# Patient Record
Sex: FEMALE | Race: WHITE | Employment: STUDENT | URBAN - METROPOLITAN AREA
[De-identification: names, ages, dates, MRNs, and addresses within clinical notes are randomized per-mention and may not be internally consistent; named-entity substitution may affect disease eponyms.]

---

## 2017-01-04 ENCOUNTER — PRE VISIT (OUTPATIENT)
Dept: NEUROLOGY | Facility: CLINIC | Age: 22
End: 2017-01-04

## 2017-10-04 ENCOUNTER — TRANSFERRED RECORDS (OUTPATIENT)
Dept: HEALTH INFORMATION MANAGEMENT | Facility: CLINIC | Age: 22
End: 2017-10-04

## 2017-10-04 ENCOUNTER — MEDICAL CORRESPONDENCE (OUTPATIENT)
Dept: HEALTH INFORMATION MANAGEMENT | Facility: CLINIC | Age: 22
End: 2017-10-04

## 2017-11-14 NOTE — TELEPHONE ENCOUNTER
APPT INFO    Date /Time: 11/22/17 at 1:15PM   Reason for Appt: Sinusitis   Ref Provider/Clinic: Nenita Palomo @ [place]   Are there internal records? If yes, list: CT Head/Sinus 10/4/17 in [facility].   Patient Contact (Y/N) & Call Details: No, referred   Action: Faxed request to [facility]     OUTSIDE RECORDS CHECKLIST     CLINIC NAME COMMENTS REC (x) IMG (x)   [facility]  Records in [facility] X none

## 2017-11-22 ENCOUNTER — PRE VISIT (OUTPATIENT)
Dept: OTOLARYNGOLOGY | Facility: CLINIC | Age: 22
End: 2017-11-22

## 2017-11-22 ENCOUNTER — OFFICE VISIT (OUTPATIENT)
Dept: OTOLARYNGOLOGY | Facility: CLINIC | Age: 22
End: 2017-11-22

## 2017-11-22 VITALS — WEIGHT: 162 LBS | BODY MASS INDEX: 29.81 KG/M2 | HEIGHT: 62 IN

## 2017-11-22 DIAGNOSIS — G44.89 OTHER HEADACHE SYNDROME: Primary | ICD-10-CM

## 2017-11-22 DIAGNOSIS — R09.81 NASAL CONGESTION: ICD-10-CM

## 2017-11-22 DIAGNOSIS — E07.89 THYROID FULLNESS: ICD-10-CM

## 2017-11-22 LAB — TSH SERPL DL<=0.005 MIU/L-ACNC: 2.79 MU/L (ref 0.4–4)

## 2017-11-22 RX ORDER — NORETHINDRONE ACETATE AND ETHINYL ESTRADIOL AND FERROUS FUMARATE 5-7-9-7
1 KIT ORAL DAILY
COMMUNITY

## 2017-11-22 ASSESSMENT — PAIN SCALES - GENERAL: PAINLEVEL: MILD PAIN (2)

## 2017-11-22 NOTE — MR AVS SNAPSHOT
After Visit Summary   2017    Annette Silverman    MRN: [id number]           Patient Information     Date Of Birth          1995        Visit Information        Provider Department      2017 1:15 PM Cassidy Youssef MD [facility] Ear Nose and Throat        Today's Diagnoses     Headache    -  1    Nasal congestion        Thyroid fullness          Care Instructions    Plan of care:  Follow up with the following specialties:  Allergy  Neurology  Have blood drawn today, we will contact you with results  Clinic contact information:  1. To schedule an appointment call [phone], option 1  2. To talk to the Triage RN call [phone], option 3  3. If you need to speak to Irene or get a message to your doctor on a Friday, call the triage RN  4. IreneRN: [phone]  5. Surgery scheduling:      Elisa Nichols: [phone]      Sabrina Sherwood: [phone]  6. Fax: [phone]  7. [phone]            Follow-ups after your visit        Additional Services     ALLERGY/ASTHMA ADULT REFERRAL       Your provider has referred you to: [facility] - [phone]  [email or web address]    Please be aware that coverage of these services is subject to the terms and limitations of your health insurance plan.  Call member services at your health plan with any benefit or coverage questions.      Please bring the following with you to your appointment:    (1) Any X-Rays, CTs or MRIs which have been performed.  Contact the facility where they were done to arrange for  prior to your scheduled appointment.    (2) List of current medications  (3) This referral request   (4) Any documents/labs given to you for this referral            NEUROLOGY ADULT REFERRAL       Your provider has referred you for the following:   EEG at  [facility]: [facility] [phone]    [email or web address]  FMG: [facility] [phone]   [email or web address]  UM: Neurology [facility] [phone]   [email or web address]  General Neurology/ Shashank Pruitt for headaches    Please be aware that coverage of these services is subject to the terms and limitations of your health insurance plan.  Call member services at your health plan with any benefit or coverage questions.      Please bring the following with you to your appointment:    (1) Any X-Rays, CTs or MRIs which have been performed.  Contact the facility where they were done to arrange for  prior to your scheduled appointment.    (2) List of current medications  (3) This referral request   (4) Any documents/labs given to you for this referral                  Your next 10 appointments already scheduled     Nov 27, 2017 12:15 PM CST   (Arrive by 12:00 PM)   New Allergy with Jason Yanes MD   [facility] ([facility])    [address] [place] [id number] [phone]           Do not take anti-histamines or Zantac for seven day prior to your appointment.            Jan 04, 2018 12:00 PM CST   (Arrive by 11:45 AM)   New Patient Visit with DANIELLE Flores [facility] Neurology ([facility])    [address] [place] [id number] [phone]              Future tests that were ordered for you today     Open Future Orders        Priority Expected Expires Ordered    TSH Routine  11/22/2018 11/22/2017            Who to contact     Please call your clinic at [phone] to:    Ask questions about your health    Make or cancel appointments    Discuss your medicines    Learn about your test results    Speak to your doctor   If you have  "compliments or concerns about an experience at your clinic, or if you wish to file a complaint, please contact [facility] Patient Relations at [phone] or email us at [email or web address]         Additional Information About Your Visit        GlucoSentienthart Information     3DMGAME is an electronic gateway that provides easy, online access to your medical records. With 3DMGAME, you can request a clinic appointment, read your test results, renew a prescription or communicate with your care team.     To sign up for 3DMGAME visit the website at [email or web address]   You will be asked to enter the access code listed below, as well as some personal information. Please follow the directions to create your username and password.     Your access code is: [email or web address]  Expires: 2018  6:30 AM     Your access code will  in 90 days. If you need help or a new code, please contact your [facility] Clinic or call [phone] for assistance.        Care EveryWhere ID     This is your Care EveryWhere ID. This could be used by other organizations to access your [place] medical records  [id number]        Your Vitals Were     Height BMI (Body Mass Index)                1.575 m (5' 2\") 29.63 kg/m2           Blood Pressure from Last 3 Encounters:   No data found for BP    Weight from Last 3 Encounters:   17 73.5 kg (162 lb)              We Performed the Following     ALLERGY/ASTHMA ADULT REFERRAL     NEUROLOGY ADULT REFERRAL        Primary Care Provider    Physician No Ref-Primary       NO REF-PRIMARY PHYSICIAN        Equal Access to Services     LANDY MURCIA : Hadii aad ku hadasho Soomaali, waaxda luqadaha, qaybta kaalmada adeegyada, waxay sachinin joon stevenson . So [facility] [phone].    ATENCIÓN: Si habla español, tiene a campos disposición servicios gratuitos de asistencia lingüística. Llame al [phone].    We comply with applicable " federal civil rights laws and [place] laws. We do not discriminate on the basis of race, color, national origin, age, disability, sex, sexual orientation, or gender identity.            Thank you!     Thank you for choosing [facility] EAR NOSE AND THROAT  for your care. Our goal is always to provide you with excellent care. Hearing back from our patients is one way we can continue to improve our services. Please take a few minutes to complete the written survey that you may receive in the mail after your visit with us. Thank you!             Your Updated Medication List - Protect others around you: Learn how to safely use, store and throw away your medicines at www.disposemymeds.org.          This list is accurate as of: 11/22/17  2:15 PM.  Always use your most recent med list.                   Brand Name Dispense Instructions for use Diagnosis    losartan 2.5 mg/mL Susp    COZAAR     Take by mouth daily        norethindrone-ethinyl estradiol-iron 1-20/1-30/1-35 MG-MCG per tablet    ESTROSTEP FE     Take 1 tablet by mouth daily

## 2017-11-22 NOTE — PROGRESS NOTES
Otolaryngology Adult Consultation    Patient: Annette Silverman   : 1995     Patient presents with:  Consult:   Chief Complaint   Patient presents with     Consult     sinusitis        Referring Provider: Nenita Palomo   Consulting Physician:  Cassidy Youssef MD       Assessment/Plan: Annette has mild thyroid fullness for which I am going to order a TSH.  I also believe that she has a muscle tension type headache, and I would like her to be seen by Jacqueline Pruitt to see if there could be a component of rebound and also to recommend any therapies for muscle tension type headache.  She has concerns about allergies, and I think it is reasonable to have her seen by the allergy department as well.  Given her normal sinus CT scan, I would not recommend any surgical intervention or aggressive medical management for sinus issues.          HPI: Annette Silverman is a 22 year old female seen today in the Otolaryngology Clinicconsultation from Nenita Palomo for chronic postnasal drainage, pressure, ear pressure and head pain.  She states that she is having sinus infections about every four months.  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  She moved here in August.  She had a sinus infection at the end of July, and she felt like it never went away.  She tried prednisone which helped a little bit.  She tried multiple antibiotics which did not help.  She ran out of her nasal steroids.  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  She has no sleep apnea symptoms.  She takes Excedrin at least one or two times per week.  The only other medication is an oral contraceptive.  She drinks one cup of caffeine in the morning.  She denies nasal congestion, rhinorrhea and has not been diagnosed in the past with allergies. She did not have sinus imaging until recently which shows completely clear sinuses.         No current outpatient prescriptions on file prior to visit.  No  current facility-administered medications on file prior to visit.      No Known Allergies    Past Medical History:   Diagnosis Date     Autoimmune disease (H)     Psorasis         Review of Systems   ENT ROS 11/22/2017   Neurology Headache   Ears, Nose, Throat Ear pain, Ringing/noise in ears, Nasal congestion or drainage, Sore throat        14 point ROS neg other than the symptoms noted above.    Physical Exam:    General Assessment   The patient is alert, oriented and in no acute distress.   Head/Face/Scalp  Grossly normal.   Ears  Normal canals, auricles and tympanic membranes.   Nose  External nose is straight, skin is normal. Intranasal exam (anterior rhinoscopy) reveals normal moist mucosa, turbinate tissue without edema, erythema or crusting.  Septum mainly in the midline.    Mouth  Oral cavity shows healthy mucosa with out ulceration, masses or other lesions involving the tongue, palate, buccal mucosa, floor of mouth or gingiva.  Dentition in good repair.  Oropharynx with normal tonsils, posterior wall and base of tongue.  Neck  No significant adenopathy, thyroid or salivary gland abnormality.  Tender over TMJ, neck muscles.          Imaging:    Results for orders placed in visit on 10/04/17   CT MAXILLOFACIAL W/O CONTRAST    Status: Normal 10/4/2017    Narrative CT MAXILLOFACIAL W/O CONTRAST 10/4/2017 3:14 PM    Provided History: 2 MO of frontal and ethmoid sinus pain and pressure,  s/p 2 rounds of abx, Chronic sinusitis, unspecified     Comparison: None available     Technique: Using thin collimation multidetector helical acquisition  technique, axial, coronal, and sagittal thin section CT images were  reconstructed through the paranasal sinuses. Images were reviewed in  bone and soft tissue windows.    Findings:   The paranasal sinuses are clear.    The ostiomeatal units appear patent bilaterally. The bony walls of the  paranasal sinuses are intact.    Normal retromaxillary and pterygopalatine  fat.    The adenoid tonsils in the nasopharynx are unremarkable.      Impression Impression:    No evidence of sinusitis.    I have personally reviewed the examination and initial interpretation  and I agree with the findings.    RADHA YI MD

## 2017-11-22 NOTE — LETTER
2017       RE: Annette Silverman  [address]   [place] [address]     Dear Colleague,    Thank you for referring your patient, Annette Silverman, to the [facility] EAR NOSE AND TH[facility]AT at [facility]. Please see a copy of my visit note below.    Otolaryngology Adult Consultation    Patient: Annette Silverman   : 1995     Patient presents with:  Consult:   Chief Complaint   Patient presents with     Consult     sinusitis        Referring Provider: Nenita Palomo   Consulting Physician:  Cassidy Youssef MD       Assessment/Plan: Annette has mild thyroid fullness for which I am going to order a TSH.  I also believe that she has a muscle tension type headache, and I would like her to be seen by Jacqueline Pruitt to see if there could be a component of rebound and also to recommend any therapies for muscle tension type headache.  She has concerns about allergies, and I think it is reasonable to have her seen by the allergy department as well.  Given her normal sinus CT scan, I would not recommend any surgical intervention or aggressive medical management for sinus issues.          HPI: Annette Silverman is a 22 year old female seen today in the Otolaryngology Clinicconsultation from Nenita Palomo for chronic postnasal drainage, pressure, ear pressure and head pain.  She states that she is having sinus infections about every four months.  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  She moved here in August.  She had a sinus infection at the end of July, and she felt like it never went away.  She tried prednisone which helped a little bit.  She tried multiple antibiotics which did not help.  She ran out of her nasal steroids.  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  She has no sleep apnea symptoms.  She takes Excedrin at least one or two times per week.  The only other medication is an oral contraceptive.  She  drinks one cup of caffeine in the morning.  She denies nasal congestion, rhinorrhea and has not been diagnosed in the past with allergies. She did not have sinus imaging until recently which shows completely clear sinuses.         No current outpatient prescriptions on file prior to visit.  No current facility-administered medications on file prior to visit.      No Known Allergies    Past Medical History:   Diagnosis Date     Autoimmune disease (H)     Psorasis         Review of Systems   ENT ROS 11/22/2017   Neurology Headache   Ears, Nose, Throat Ear pain, Ringing/noise in ears, Nasal congestion or drainage, Sore throat        14 point ROS neg other than the symptoms noted above.    Physical Exam:    General Assessment   The patient is alert, oriented and in no acute distress.   Head/Face/Scalp  Grossly normal.   Ears  Normal canals, auricles and tympanic membranes.   Nose  External nose is straight, skin is normal. Intranasal exam (anterior rhinoscopy) reveals normal moist mucosa, turbinate tissue without edema, erythema or crusting.  Septum mainly in the midline.    Mouth  Oral cavity shows healthy mucosa with out ulceration, masses or other lesions involving the tongue, palate, buccal mucosa, floor of mouth or gingiva.  Dentition in good repair.  Oropharynx with normal tonsils, posterior wall and base of tongue.  Neck  No significant adenopathy, thyroid or salivary gland abnormality.  Tender over TMJ, neck muscles.          Imaging:    Results for orders placed in visit on 10/04/17   CT MAXILLOFACIAL W/O CONTRAST    Status: Normal 10/4/2017    Narrative CT MAXILLOFACIAL W/O CONTRAST 10/4/2017 3:14 PM    Provided History: 2 MO of frontal and ethmoid sinus pain and pressure,  s/p 2 rounds of abx, Chronic sinusitis, unspecified     Comparison: None available     Technique: Using thin collimation multidetector helical acquisition  technique, axial, coronal, and sagittal thin section CT images were  reconstructed  through the paranasal sinuses. Images were reviewed in  bone and soft tissue windows.    Findings:   The paranasal sinuses are clear.    The ostiomeatal units appear patent bilaterally. The bony walls of the  paranasal sinuses are intact.    Normal retromaxillary and pterygopalatine fat.    The adenoid tonsils in the nasopharynx are unremarkable.      Impression Impression:    No evidence of sinusitis.    I have personally reviewed the examination and initial interpretation  and I agree with the findings.    RADHA YI MD        Again, thank you for allowing me to participate in the care of your patient.      Sincerely,    Cassidy Youssef MD

## 2017-11-22 NOTE — NURSING NOTE
Chief Complaint   Patient presents with     Consult     sinusitis       [facility] Medical Assistant

## 2017-11-22 NOTE — PATIENT INSTRUCTIONS
Plan of care:  Follow up with the following specialties:  Allergy  Neurology  Have blood drawn today, we will contact you with results  Clinic contact information:  1. To schedule an appointment call [phone], option 1  2. To talk to the Triage RN call [phone], option 3  3. If you need to speak to Irene or get a message to your doctor on a Friday, call the triage RN  4. IreneRN: [phone]  5. Surgery scheduling:      Elisa Nichols: [phone]      Sabrina Sherwood: [phone]  6. Fax: [phone]  7. [phone]

## 2017-11-24 NOTE — TELEPHONE ENCOUNTER
APPT INFO    Date /Time: 11/27/17, 12:15pm   Reason for Appt: Nasal congestion   Ref Provider/Clinic: [facility]   Are there internal records? If yes, list: [facility]   Patient Contact (Y/N) & Call Details: No, referred    Action:      OUTSIDE RECORDS CHECKLIST     CLINIC NAME COMMENTS REC (x) IMG (x)

## 2017-11-27 ENCOUNTER — OFFICE VISIT (OUTPATIENT)
Dept: PULMONOLOGY | Facility: CLINIC | Age: 22
End: 2017-11-27
Attending: ALLERGY & IMMUNOLOGY
Payer: COMMERCIAL

## 2017-11-27 ENCOUNTER — PRE VISIT (OUTPATIENT)
Dept: PULMONOLOGY | Facility: CLINIC | Age: 22
End: 2017-11-27

## 2017-11-27 VITALS
SYSTOLIC BLOOD PRESSURE: 138 MMHG | HEART RATE: 99 BPM | OXYGEN SATURATION: 98 % | WEIGHT: 150 LBS | HEIGHT: 62 IN | DIASTOLIC BLOOD PRESSURE: 79 MMHG | RESPIRATION RATE: 16 BRPM | BODY MASS INDEX: 27.6 KG/M2

## 2017-11-27 DIAGNOSIS — J30.1 CHRONIC SEASONAL ALLERGIC RHINITIS DUE TO POLLEN: ICD-10-CM

## 2017-11-27 DIAGNOSIS — R09.81 NASAL CONGESTION: ICD-10-CM

## 2017-11-27 DIAGNOSIS — J30.89 ALLERGIC RHINITIS DUE TO AMERICAN HOUSE DUST MITE: Primary | ICD-10-CM

## 2017-11-27 PROCEDURE — 99212 OFFICE O/P EST SF 10 MIN: CPT | Mod: 25,ZF

## 2017-11-27 PROCEDURE — 95004 PERQ TESTS W/ALRGNC XTRCS: CPT | Performed by: ALLERGY & IMMUNOLOGY

## 2017-11-27 ASSESSMENT — PAIN SCALES - GENERAL: PAINLEVEL: NO PAIN (0)

## 2017-11-27 NOTE — PROGRESS NOTES
"Reason for Visit  Annette Silverman is a 22 year old female who is referred by No Ref-Primary, Physician for nasal congestion.    Allergy HPI  She moved here in August and since that time she's had a lot of stuffy nose and postnasal drip and sore throat and headaches and some sneezing. She is from [place] and worked with a lot of swimming and contributed her sinus symptoms at that time to swimming.  She tried Flonase and slightly helped. She was given amoxicillin and some prednisone and didn't really help. She states she had a CT scan of her sinuses everything looked okay. She never had allergy testing.    Social history: no pets no smokers    Family history: Brother with allergies.    The patient was seen and examined by Jason Lira MD   Current Outpatient Prescriptions   Medication     losartan (COZAAR) 2.5 mg/mL SUSP     norethindrone-ethinyl estradiol-iron (ESTROSTEP FE) 1-20/1-30/1-35 MG-MCG per tablet     No current facility-administered medications for this visit.      No Known Allergies  Social History     Social History     Marital status: Single     Spouse name: N/A     Number of children: N/A     Years of education: N/A     Occupational History     Not on file.     Social History Main Topics     Smoking status: Never Smoker     Smokeless tobacco: Never Used     Alcohol use No     Drug use: No     Sexual activity: Yes     Partners: Male     Birth control/ protection: Pill     Other Topics Concern     Not on file     Social History Narrative     Past Medical History:   Diagnosis Date     Autoimmune disease (H)     Psorasis     No past surgical history on file.  Family History   Problem Relation Age of Onset     Stomach Problem Mother          ROS   A complete ROS was otherwise negative except as noted in the HPI and the end of the note.  /79  Pulse 99  Resp 16  Ht 1.575 m (5' 2\")  Wt 68 kg (150 lb)  SpO2 98%  BMI 27.44 kg/m2  Exam:   GENERAL APPEARANCE: Well developed, well " nourished, alert, and in no apparent distress.  EYES: PERRL, EOMI, conjunctiva clear non-injected  HENT: Nasal mucosa with mild edema and no discharge. No nasal polyps.  No facial tenderness.  EARS: Canals clear, TMs normal  MOUTH: Oral mucosa is moist, without any lesions, no tonsillar enlargement, no oropharyngeal exudate.  RESP: Good air flow throughout.  No crackles. No rhonchi. No wheezes.  CV: Normal S1, S2, regular rhythm, normal rate. No murmur.  No rub. No gallop. No LE edema.   MS: Extremities normal. No clubbing. No cyanosis.  SKIN: No rashes noted  NEURO: Speech normal, normal strength and tone, normal gait and stance  PSYCH: Normal mentation, orientation to person, place, and time.  Results: 43 percutaneous environmental allergen (and 2 controls) extracts placed by MA and read by MD.  positive to dust mites and cat and dog tree grass and weed pollen.        Assessment and plan: She has many allergies noted including dust mites are most likely causing year-round problems. Also The dogs and cats that she does not have here but she does at home in [place]. She is also positive to tree grass and weed pollens. The pollens in [place] (weeds) August most likely caused her exacerbation. Recommended Qvar within the nose one spray twice a day as well as 10 mg cetirizine. We will see her back in about 2 months and can discuss allergy shots. She'll also work on environmental control measures.

## 2017-11-27 NOTE — LETTER
11/27/2017       RE: Annette Silverman  [address]   [facility] [address]     Dear Colleague,    Thank you for referring your patient, Annette Silverman, to the [facility] at [facility]. Please see a copy of my visit note below.    Reason for Visit  Annette Silverman is a 22 year old female who is referred by No Ref-Primary, Physician for nasal congestion.    Allergy HPI  She moved here in August and since that time she's had a lot of stuffy nose and postnasal drip and sore throat and headaches and some sneezing. She is from [place] and worked with a lot of swimming and contributed her sinus symptoms at that time to swimming.  She tried Flonase and slightly helped. She was given amoxicillin and some prednisone and didn't really help. She states she had a CT scan of her sinuses everything looked okay. She never had allergy testing.    Social history: no pets no smokers    Family history: Brother with allergies.    The patient was seen and examined by Jason Lira MD   Current Outpatient Prescriptions   Medication     losartan (COZAAR) 2.5 mg/mL SUSP     norethindrone-ethinyl estradiol-iron (ESTROSTEP FE) 1-20/1-30/1-35 MG-MCG per tablet     No current facility-administered medications for this visit.      No Known Allergies  Social History     Social History     Marital status: Single     Spouse name: N/A     Number of children: N/A     Years of education: N/A     Occupational History     Not on file.     Social History Main Topics     Smoking status: Never Smoker     Smokeless tobacco: Never Used     Alcohol use No     Drug use: No     Sexual activity: Yes     Partners: Male     Birth control/ protection: Pill     Other Topics Concern     Not on file     Social History Narrative     Past Medical History:   Diagnosis Date     Autoimmune disease (H)     Psorasis     No past surgical history on file.  Family History  "  Problem Relation Age of Onset     Stomach Problem Mother          ROS   A complete ROS was otherwise negative except as noted in the HPI and the end of the note.  /79  Pulse 99  Resp 16  Ht 1.575 m (5' 2\")  Wt 68 kg (150 lb)  SpO2 98%  BMI 27.44 kg/m2  Exam:   GENERAL APPEARANCE: Well developed, well nourished, alert, and in no apparent distress.  EYES: PERRL, EOMI, conjunctiva clear non-injected  HENT: Nasal mucosa with mild edema and no discharge. No nasal polyps.  No facial tenderness.  EARS: Canals clear, TMs normal  MOUTH: Oral mucosa is moist, without any lesions, no tonsillar enlargement, no oropharyngeal exudate.  RESP: Good air flow throughout.  No crackles. No rhonchi. No wheezes.  CV: Normal S1, S2, regular rhythm, normal rate. No murmur.  No rub. No gallop. No LE edema.   MS: Extremities normal. No clubbing. No cyanosis.  SKIN: No rashes noted  NEURO: Speech normal, normal strength and tone, normal gait and stance  PSYCH: Normal mentation, orientation to person, place, and time.  Results: 43 percutaneous environmental allergen (and 2 controls) extracts placed by MA and read by MD.  positive to dust mites and cat and dog tree grass and weed pollen.        Assessment and plan: She has many allergies noted including dust mites are most likely causing year-round problems. Also The dogs and cats that she does not have here but she does at home in [place]. She is also positive to tree grass and weed pollens. The pollens in [place] (weeds) August most likely caused her exacerbation. Recommended Qvar within the nose one spray twice a day as well as 10 mg cetirizine. We will see her back in about 2 months and can discuss allergy shots. She'll also work on environmental control measures.          Again, thank you for allowing me to participate in the care of your patient.      Sincerely,    Jason Lira MD      "

## 2017-11-27 NOTE — NURSING NOTE
Chief Complaint   Patient presents with     Consult     New consult on Meilie and her allergy issues     Cecil Mcclelland CMA at 12:20 PM on 11/27/2017

## 2017-11-27 NOTE — MR AVS SNAPSHOT
After Visit Summary   11/27/2017    Annette Silverman    MRN: [id number]           Patient Information     Date Of Birth          1995        Visit Information        Provider Department      11/27/2017 12:15 PM Jason Yanes MD [facility]        Today's Diagnoses     Allergic rhinitis due to American house dust mite    -  1    Nasal congestion          Care Instructions    Positive to dust mties, cat, dog, tree, grass and weed pollen.  I recommend qvar or flovent into the nose 1 puff in each nostril daily and 10 mg zyrtec or the generic.  After 2 months we will see the response and we can consider allergy shots.      DUST MITE ALLERGY  Dust mites are microscopic bugs that live in dust, feeding on dead skin from our bodies. Dust mites flourish in warm, humid environments and therefore are highest  in number in carpeting, pillows and mattresses.     Tips:    Encase pillows, mattresses, and box springs in zippered allergy proof covers.    Wash all bed linens in at least 1300 F every week.    Remove stuffed animals or freeze them every other week.     Remove upholstered furniture from the bedroom and consider removing the carpet.     Keep ceiling fans off in the bedroom as they can stir up dust mite allergens.    Frequently dust and vacuum the house, especially the bedroom.    Acaracides (chemicals which kill mites) are available for use in the home. These acaracides require repeated applications to remain effective and may irritate asthmatics. It is still unclear how much, if any clinical benefit is gained by the use of acaracides. Therefore these agents are usually not recommended for initial mite control.        *All information has been reviewed, updated and approved by:  Dr. Jason [facility]anes[facility] updated: 11/2016         Pollen Control Measures      * Keep windows and doors shut and run air conditioner at all  "times. This keeps outdoor air outside.    * Keep windows closed while in the car and air conditioner on the \"re-circulate\" mode.    * Wash your hair before going to bed at night to reduce exposure during sleep.    * Keep pets outdoors to prevent the pollen from being brought in on their hair.    * Avoid hanging clothes and linens outside as pollens may collect on the items.     * Don't mow the lawn if you are allergic to grass or weeds. If you must mow the grass, wear a face mask.       Spring: Tree Pollen    Summer: Grass Pollen and Mold    Fall: Weed Pollen and Mold      *All information has been reviewed, updated and approved by:  Dr. Jaosn [facility]anes[facility] at [facility] updated: 11/2016          Follow-ups after your visit        Follow-up notes from your care team     Return in about 2 months (around 1/27/2018).      Your next 10 appointments already scheduled     Jan 04, 2018 12:00 PM CST   (Arrive by 11:45 AM)   New Patient Visit with DANIELLE Flores [facility] ([email or web address])    [address] [place] [id number] [phone]            Feb 19, 2018  9:55 AM CST   (Arrive by 9:40 AM)   Return Allergy with Jason Yanes MD   [facility] and [facility] ([email or web address] and Surgery [place])    [address] [place] [id number] [phone]           Do not take anti-histamines or Zantac for seven day prior to your appointment.              Who to contact     If you have questions or need follow up information about today's clinic visit or your schedule please contact [facility] directly at [phone].  Normal or non-critical lab and imaging results will be communicated to you by MyChart, letter or phone within 4 business days after the clinic has received the results. If you do not hear from us within 7 days, please " "contact the clinic through [email or web address] or phone. If you have a critical or abnormal lab result, we will notify you by phone as soon as possible.  Submit refill requests through [email or web address] or call your pharmacy and they will forward the refill request to us. Please allow 3 business days for your refill to be completed.          Additional Information About Your Visit        [place] Information     [email or web address] gives you secure access to your electronic health record. If you see a primary care provider, you can also send messages to your care team and make appointments. If you have questions, please call your primary care clinic.  If you do not have a primary care provider, please call [phone] and they will assist you.        Care EveryWhere ID     This is your Care EveryWhere ID. This could be used by other organizations to access your [place] medical records  [id number]        Your Vitals Were     Pulse Respirations Height Pulse Oximetry BMI (Body Mass Index)       99 16 1.575 m (5' 2\") 98% 27.44 kg/m2        Blood Pressure from Last 3 Encounters:   11/27/17 138/79    Weight from Last 3 Encounters:   11/27/17 68 kg (150 lb)   11/22/17 73.5 kg (162 lb)              Today, you had the following     No orders found for display       Primary Care Provider Fax #    Physician No Ref-Primary [phone]       No address on file        Equal Access to Services     LANDY MURCIA : Hadii ronald herrerao Sohalali, waaxda luqadaha, qaybta kaalmada adeegyada, sedrick stevenson . So [facility] [phone].    ATENCIÓN: Si habla español, tiene a campos disposición servicios gratuitos de asistencia lingüística. Llame al [phone].    We comply with applicable federal civil rights laws and [place] laws. We do not discriminate on the basis of race, color, national origin, age, disability, sex, sexual orientation, or gender identity.            Thank you!     Thank you for choosing [facility] LUNG SCIENCE [facility] " HEALTH  for your care. Our goal is always to provide you with excellent care. Hearing back from our patients is one way we can continue to improve our services. Please take a few minutes to complete the written survey that you may receive in the mail after your visit with us. Thank you!             Your Updated Medication List - Protect others around you: Learn how to safely use, store and throw away your medicines at www.disposemymeds.org.          This list is accurate as of: 11/27/17  1:22 PM.  Always use your most recent med list.                   Brand Name Dispense Instructions for use Diagnosis    losartan 2.5 mg/mL Susp    COZAAR     Take by mouth daily        norethindrone-ethinyl estradiol-iron 1-20/1-30/1-35 MG-MCG per tablet    ESTROSTEP FE     Take 1 tablet by mouth daily

## 2017-11-27 NOTE — PATIENT INSTRUCTIONS
"Positive to dust mties, cat, dog, tree, grass and weed pollen.  I recommend qvar or flovent into the nose 1 puff in each nostril daily and 10 mg zyrtec or the generic.  After 2 months we will see the response and we can consider allergy shots.      DUST MITE ALLERGY  Dust mites are microscopic bugs that live in dust, feeding on dead skin from our bodies. Dust mites flourish in warm, humid environments and therefore are highest  in number in carpeting, pillows and mattresses.     Tips:    Encase pillows, mattresses, and box springs in zippered allergy proof covers.    Wash all bed linens in at least 1300 F every week.    Remove stuffed animals or freeze them every other week.     Remove upholstered furniture from the bedroom and consider removing the carpet.     Keep ceiling fans off in the bedroom as they can stir up dust mite allergens.    Frequently dust and vacuum the house, especially the bedroom.    Acaracides (chemicals which kill mites) are available for use in the home. These acaracides require repeated applications to remain effective and may irritate asthmatics. It is still unclear how much, if any clinical benefit is gained by the use of acaracides. Therefore these agents are usually not recommended for initial mite control.        *All information has been reviewed, updated and approved by:  Dr. Jason Yanes-[facility] at [facility] updated: 11/2016         Pollen Control Measures      * Keep windows and doors shut and run air conditioner at all times. This keeps outdoor air outside.    * Keep windows closed while in the car and air conditioner on the \"re-circulate\" mode.    * Wash your hair before going to bed at night to reduce exposure during sleep.    * Keep pets outdoors to prevent the pollen from being brought in on their hair.    * Avoid hanging clothes and linens outside as pollens may collect on the items.     * Don't mow the lawn if you are allergic to grass or weeds. If " you must mow the grass, wear a face mask.       Spring: Tree Pollen    Summer: Grass Pollen and Mold    Fall: Weed Pollen and Mold      *All information has been reviewed, updated and approved by:  Dr. Jason Yanes[facility] at [facility] updated: 11/2016

## 2017-12-19 ENCOUNTER — MEDICAL CORRESPONDENCE (OUTPATIENT)
Dept: HEALTH INFORMATION MANAGEMENT | Facility: CLINIC | Age: 22
End: 2017-12-19

## 2017-12-20 NOTE — TELEPHONE ENCOUNTER
APPT INFO    Date /Time: 1/4/18, 12pm   Reason for Appt: Headaches    Ref Provider/Clinic: [facility]   Are there internal records? Yes/No?  IF YES, list clinic names: [facility]   Are there outside records? Yes/No? no   Patient Contact (Y/N) & Call Details: No, referred    Action:

## 2018-01-04 ENCOUNTER — OFFICE VISIT (OUTPATIENT)
Dept: NEUROLOGY | Facility: CLINIC | Age: 23
End: 2018-01-04
Payer: COMMERCIAL

## 2018-01-04 VITALS
BODY MASS INDEX: 30.18 KG/M2 | HEIGHT: 62 IN | WEIGHT: 164 LBS | HEART RATE: 84 BPM | DIASTOLIC BLOOD PRESSURE: 75 MMHG | SYSTOLIC BLOOD PRESSURE: 126 MMHG

## 2018-01-04 DIAGNOSIS — R51.9 FRONTAL HEADACHE: Primary | ICD-10-CM

## 2018-01-04 DIAGNOSIS — R51.0 POSITIONAL HEADACHE: ICD-10-CM

## 2018-01-04 DIAGNOSIS — R51.9 FRONTAL HEADACHE: ICD-10-CM

## 2018-01-04 LAB
BASOPHILS # BLD AUTO: 0 10E9/L (ref 0–0.2)
BASOPHILS NFR BLD AUTO: 0.2 %
DIFFERENTIAL METHOD BLD: NORMAL
EOSINOPHIL # BLD AUTO: 0.1 10E9/L (ref 0–0.7)
EOSINOPHIL NFR BLD AUTO: 1.1 %
ERYTHROCYTE [DISTWIDTH] IN BLOOD BY AUTOMATED COUNT: 11.4 % (ref 10–15)
HCT VFR BLD AUTO: 37.1 % (ref 35–47)
HGB BLD-MCNC: 12.3 G/DL (ref 11.7–15.7)
IMM GRANULOCYTES # BLD: 0 10E9/L (ref 0–0.4)
IMM GRANULOCYTES NFR BLD: 0.2 %
LYMPHOCYTES # BLD AUTO: 1.6 10E9/L (ref 0.8–5.3)
LYMPHOCYTES NFR BLD AUTO: 37.3 %
MCH RBC QN AUTO: 31.2 PG (ref 26.5–33)
MCHC RBC AUTO-ENTMCNC: 33.2 G/DL (ref 31.5–36.5)
MCV RBC AUTO: 94 FL (ref 78–100)
MONOCYTES # BLD AUTO: 0.3 10E9/L (ref 0–1.3)
MONOCYTES NFR BLD AUTO: 6.6 %
NEUTROPHILS # BLD AUTO: 2.4 10E9/L (ref 1.6–8.3)
NEUTROPHILS NFR BLD AUTO: 54.6 %
NRBC # BLD AUTO: 0 10*3/UL
NRBC BLD AUTO-RTO: 0 /100
PLATELET # BLD AUTO: 152 10E9/L (ref 150–450)
RBC # BLD AUTO: 3.94 10E12/L (ref 3.8–5.2)
WBC # BLD AUTO: 4.4 10E9/L (ref 4–11)

## 2018-01-04 ASSESSMENT — PAIN SCALES - GENERAL: PAINLEVEL: NO PAIN (0)

## 2018-01-04 NOTE — PROGRESS NOTES
"Re: Annette Silverman      MRN# [id number]  YOB: 1995  Date of Visit:2018     OUTPATIENT NEUROLOGY VISIT NOTE    Chief Complaint:  Headache evaluation    History of Present Illness  Annette Silverman is a 22-year-old right-handed female presents to the clinic today for headache evaluation. Patient has seen by KALPANA Jacinto [facility] for chronic postnasal drainage, ear pressure and head pain.   Patient is from [place] and in Grad School PhD in political science.     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 This  of . Mother has chronic migraine headaches    Current Headache Pattern:      Frequency (How many headache days per month?): 2-3 times per week for about 4 month (after moving in from [place] and adjusting to Grad School).      Duration of Headache:  A couple of hours     Aura: none   Associated Symptoms: loud noise sensitivity, no nausea or vomiting, dizziness at times, no visual changes       Description of Headache Pain & Location:  Bitemporal or frontal and pressure over frontal sinuses, sometimes throbbing feeling      Level of Pain as headache is startin/10      Level of Pain during the worst headache:  5/10      Do headaches interfere with or prevent usual activities or diminish your productivity at home or work?  No, but \"continue to do things\".     Treatments Tried:  Ibuprofen 200 mg as needed and helps with the headache    Have you needed to utilize the Emergency Room to treat your headache symptoms?  If so, how often and when was the last time used:  No    Are Headaches worsening over time?  No    What makes your headaches better?  \"moving around\", going outside, working out and ibuprofen  Headache is not positional.   What makes your headaches worse or triggers your headaches? No " specific triggers but may be seasonal allergies     OCP for 5 years and the same OCP  Sleeps is Ok but wakes up with headache. No sleep  Caffeine-one cup per day  Water intake-3-4 water bottles  Denies skipping meals  Exercises about 5 times per week-does an elliptical for an hour.     Denies history of head or neck trauma, dizziness, vertigo, loss of consciousness, seizure, double vision, blurred vision, hearing difficulty, speech or swallowing difficulty, weakness or numbness in face, arms or legs, urinary or bowel incontinence, coordination problems or gait difficulty, fever or chills.    Neurodiagnostic Testing  CT results reviewed  CT MAXILLOFACIAL W/O CONTRAST 10/4/2017 3:14 PM     Provided History: 2 MO of frontal and ethmoid sinus pain and pressure,  s/p 2 rounds of abx, Chronic sinusitis, unspecified      Comparison: None available      Technique: Using thin collimation multidetector helical acquisition  technique, axial, coronal, and sagittal thin section CT images were  reconstructed through the paranasal sinuses. Images were reviewed in  bone and soft tissue windows.     Findings:   The paranasal sinuses are clear.     The ostiomeatal units appear patent bilaterally. The bony walls of the  paranasal sinuses are intact.     Normal retromaxillary and pterygopalatine fat.     The adenoid tonsils in the nasopharynx are unremarkable.         Impression:    No evidence of sinusitis.     I have personally reviewed the examination and initial interpretation  and I agree with the findings.     RADHA YI MD           MRI brain none  Lab  Results for COLTON KEENAN (MRN [id number]) as of 1/4/2018 12:34   Ref. Range 11/22/2017 14:34   TSH Latest Ref Range: 0.40 - 4.00 mU/L 2.79     Past Medical History reviewed and verified with the patient  Seasonal allergies and Zyrtec and inhaler  Otherwise healthy  Past Surgical History reviewed and verified with the patient  None  Bottom wisdom teeth removed  Family  "History reviewed and verified with the patient   Mother has chronic migraine headaches and hypethyroidism  No other neurological history  Social History:  Lives off campus with her BF of 2 years, BF is going to the [employer]    Social History   Substance Use Topics     Smoking status: Never Smoker     Smokeless tobacco: Never Used     Alcohol use No    reviewed and verified with the patient     Allergies   Allergen Reactions     Seasonal Allergies Other (See Comments)       Current Outpatient Prescriptions   Medication Sig Dispense Refill     beclomethasone (QVAR) 80 MCG/ACT Inhaler Inhale 2 puffs into the lungs daily 1 Inhaler 3     losartan (COZAAR) 2.5 mg/mL SUSP Take by mouth daily       norethindrone-ethinyl estradiol-iron (ESTROSTEP FE) 1-20/1-30/1-35 MG-MCG per tablet Take 1 tablet by mouth daily     reviewed and verified with the patient    Review of Systems:  A 12-point ROS including constitutional, eyes, ENT, respiratory, cardiovascular, gastroenterology, genitourinary, integumentary, musculoskeletal, neurology, hematology and psychiatric were all reviewed with the patient and completed at the Neuroscience Services Question nary and as mentioned in the HPI.     General Exam:   /75 (BP Location: Left arm, Patient Position: Sitting, Cuff Size: Adult Regular)  Pulse 84  Ht 1.575 m (5' 2\")  Wt 74.4 kg (164 lb)  BMI 30 kg/m2  GEN: Awake, NAD; good eye contact, responses appropriately   HEENT: Head atraumatic/Normocephalic. Scalp normal. TMJ tenderness and clicking bilaterally, paracervical muscle tendernss. Pupils equally round, 4 mm, reactive to light and accommodation, sclera and conjunctiva normal. Fundoscopic examination reveals normal vessels no apparent papilledema.   Neck: Easily moveable without resistance  Heart: S1/S2 appreciated, RRR, no m/r/g, no carotid bruits  Lungs:Lungs are clear to auscultation bilaterally, no wheezes or crackles.     Neurological Examination:  The patient is alert " "and oriented times four. Has good attention and concentration.Speech is fluent without dysarthria.   Cranial nerves:  CN I deferred.   CN II: Intact and full visual fields to confrontation bilaterally. CN III, IV, VI: EOM intact. There is no nystagmus. Has conjugated gaze. Intact direct and consensual pupillary light reflexes.   CN V: Intact and symmetrical to facial sensation in the V1 through V3 bilaterally.   CN VII: Intact and symmetrical eyebrow and lid raise and eyelid closure, smiles and frown.   CN VIII: Intact to finger rub bilaterally.   CN IX and X: The palates elevates symmetrical. The uvula is midline.   CN XII: The tongue protrudes midline with no atrophy or fasciculations.   Motor exam: The patient has a normal bulk and tone throughout. There is no atrophy, fasciculations, clonus, or abnormal movements appreciated.   Strength Exam:  5/5 strength at shoulder abduction, elbow flexion or extension, wrist flexion or extension, finger abduction, , hip flexion and extension, knee flexion and extension, and dorsiflexion and plantarflexion bilaterally.   Sensation is intact to light touch and pinprick throughout. Has good vibration and proprioception sensation at great toes bilaterally.   Reflexes are 2+ and symmetrical at biceps, triceps, brachioradialis, patellar, and Achilles. Negative Babinski with downgoing toes bilaterally.   Coordination reveals finger-nose-finger with normal speed and accuracy.   Station and gait is normal. There is no ataxia..Has no drift and a negative Romberg.     Assessment and Plan:  Headache frontal and bitemporal and \"always in the morning\" with current frequency 2-3 times per week and lasting 2-3 hours.  History of headaches for about 4 years possible triggered swimming in the 14-feet deep fish tank. Reports that headaches were 100% triggered by swimming and she would swim 4-5 times per week as part of her job requirement. Last swimming in the tank  was in August of 2017. " "Family history of headaches-mother has migraine headaches.   Differentials are migraine headache, tension headache, TMJ related, infectious/ or other etiology. Concern of headaches caused by swimming in the 14-feet deep fish tank and current headaches are only in the morning. Patient reports that headache is in the mornings \"always\" and alleviated with moving around  and some neck tenderness. Discussed possible headache etiology with the patient. Patient is healthy appearing and non focal neurological exam today. headache is possibly migrainous etiology but given her history of headache onset after swimming in the deep fish tank  it appears that additional evaluation is not unreasonable at this time.   Plan:  Headache log for frequency and possible triggers  TMJ evaluation by patient's dentist-it was suggested a mouth guard. Patient should follow her dentist recommendations  Eye exam formal thru [facility]  Lab-CBC  Brain MRI for any intracranial causes  Follow up after brain MRI    I discussed all my recommendation with Annette Silverman. The patient verbalizes understanding and comfortable with the plan. The patient has our clinic phone number to call with any questions or concerns. All of the patient's questions were answered from the best of my current knowledge.     Thank you for letting me be a part of the treatment team for Annette Silverman      Time spent with pt answering questions, discussing findings, counseling and coordinating care was more than 50% the appointment time, 56  minutes.         DANIELLE Mcclain, CNP  [facility]    "

## 2018-01-04 NOTE — MR AVS SNAPSHOT
After Visit Summary   1/4/2018    Annette Silverman    MRN: [id number]           Patient Information     Date Of Birth          1995        Visit Information        Provider Department      1/4/2018 12:00 PM Jacqueline Pruitt APRN [facility] Neurology        Today's Diagnoses     Frontal headache    -  1    Positional headache          Care Instructions    Plan:  Headache log for frequency and possible triggers  TMJ evaluation by patient's dentist-it was suggested a mouth guard. Patient should follow her dentist recommendations  Eye exam formal thru [facility]  Lab-CBC  Brain MRI for any intracranial causes  Follow up after brain MRI            Follow-ups after your visit        Your next 10 appointments already scheduled     Jan 04, 2018  1:30 PM CST   LAB with  LAB   [facility] ([facility])    [address] [place] [id number] [phone]           Please do not eat 10-12 hours before your appointment if you are coming in fasting for labs on lipids, cholesterol, or glucose (sugar). This does not apply to pregnant women. Water, hot tea and black coffee (with nothing added) are okay. Do not drink other fluids, diet soda or chew gum.            Jan 13, 2018 10:45 AM CST   (Arrive by 10:30 AM)   MR BRAIN W/O & W CONTRAST with [id number]   [facility] MRI ([facility])    [address] [place] [id number] [phone]           Take your medicines as usual, unless your doctor tells you not to. Bring a list of your current medicines to your exam (including vitamins, minerals and over-the-counter drugs).  You will be given intravenous contrast for this exam. To prepare:   The day before your exam, drink extra fluids at least six 8-ounce glasses (unless your doctor tells you to restrict your fluids).   Have a blood test (creatinine test) within 30 days of your exam. Go to your  clinic or Diagnostic Imaging Department for this test.  The MRI machine uses a strong magnet. Please wear clothes without metal (snaps, zippers). A sweatsuit works well, or we may give you a hospital gown.  Please remove any body piercings and hair extensions before you arrive. You will also remove watches, jewelry, hairpins, wallets, dentures, partial dental plates and hearing aids. You may wear contact lenses, and you may be able to wear your rings. We have a safe place to keep your personal items, but it is safer to leave them at home.   **IMPORTANT** THE INSTRUCTIONS BELOW ARE ONLY FOR THOSE PATIENTS WHO HAVE BEEN TOLD THEY WILL RECEIVE SEDATION OR GENERAL ANESTHESIA DURING THEIR MRI PROCEDURE:  IF YOU WILL RECEIVE SEDATION (take medicine to help you relax during your exam):   You must get the medicine from your doctor before you arrive. Bring the medicine to the exam. Do not take it at home.   Arrive one hour early. Bring someone who can take you home after the test. Your medicine will make you sleepy. After the exam, you may not drive, take a bus or take a taxi by yourself.   No eating 8 hours before your exam. You may have clear liquids up until 4 hours before your exam. (Clear liquids include water, clear tea, black coffee and fruit juice without pulp.)  IF YOU WILL RECEIVE ANESTHESIA (be asleep for your exam):   Arrive 1 1/2 hours early. Bring someone who can take you home after the test. You may not drive, take a bus or take a taxi by yourself.   No eating 8 hours before your exam. You may have clear liquids up until 4 hours before your exam. (Clear liquids include water, clear tea, black coffee and fruit juice without pulp.)  Please call the Imaging Department at your exam site with any questions.            Feb 19, 2018  9:55 AM CST   (Arrive by 9:40 AM)   Return Allergy with Jason Yanes MD   [facility] ([facility])     [address]   [address] [place] [id number] [phone]           Do not take anti-histamines or Zantac for seven day prior to your appointment.              Future tests that were ordered for you today     Open Future Orders        Priority Expected Expires Ordered    CBC with platelets differential Routine  4/4/2018 1/4/2018    MRI Brain w & w/o contrast Routine  1/4/2019 1/4/2018            Who to contact     Please call your clinic at [phone] to:    Ask questions about your health    Make or cancel appointments    Discuss your medicines    Learn about your test results    Speak to your doctor   If you have compliments or concerns about an experience at your clinic, or if you wish to file a complaint, please contact [facility] Patient Relations at [phone] or email us at [email or web address]         Additional Information About Your Visit        [employer] Information     [employer] gives you secure access to your electronic health record. If you see a primary care provider, you can also send messages to your care team and make appointments. If you have questions, please call your primary care clinic.  If you do not have a primary care provider, please call [phone] and they will assist you.      [employer] is an electronic gateway that provides easy, online access to your medical records. With [employer], you can request a clinic appointment, read your test results, renew a prescription or communicate with your care team.     To access your existing account, please contact your [facility] or call [phone] for assistance.        Care EveryWhere ID     This is your Care EveryWhere ID. This could be used by other organizations to access your [facility] medical records  [id number]        Your Vitals Were     Pulse Height BMI (Body Mass Index)             84 1.575 m (5' 2\") 30 kg/m2          Blood Pressure from Last 3 Encounters: "   01/04/18 126/75   11/27/17 138/79    Weight from Last 3 Encounters:   01/04/18 74.4 kg (164 lb)   11/27/17 68 kg (150 lb)   11/22/17 73.5 kg (162 lb)               Primary Care Provider Fax #    Physician No Ref-Primary [phone]       No address on file        Equal Access to Services     [facility] UCLAJAVON : Hadii aad ku hadasho Soomaali, waaxda luqadaha, qaybta kaalmada adeegyada, waxay sachinin kevinn fidelkarley khan elva . So [place] -[phone].    ATENCIÓN: Si habla español, tiene a campos disposición servicios gratuitos de asistencia lingüística. Estherame al [phone].    We comply with applicable federal civil rights laws and [place] laws. We do not discriminate on the basis of race, color, national origin, age, disability, sex, sexual orientation, or gender identity.            Thank you!     Thank you for choosing [facility] NEUROLOGY  for your care. Our goal is always to provide you with excellent care. Hearing back from our patients is one way we can continue to improve our services. Please take a few minutes to complete the written survey that you may receive in the mail after your visit with us. Thank you!             Your Updated Medication List - Protect others around you: Learn how to safely use, store and throw away your medicines at www.disposemymeds.org.          This list is accurate as of: 1/4/18  1:28 PM.  Always use your most recent med list.                   Brand Name Dispense Instructions for use Diagnosis    beclomethasone 80 MCG/ACT Inhaler    QVAR    1 Inhaler    Inhale 2 puffs into the lungs daily    Allergic rhinitis due to [employer] house dust mite       losartan 2.5 mg/mL Susp    COZAAR     Take by mouth daily        norethindrone-ethinyl estradiol-iron 1-20/1-30/1-35 MG-MCG per tablet    ESTROSTEP FE     Take 1 tablet by mouth daily

## 2018-01-04 NOTE — PATIENT INSTRUCTIONS
Plan:  Headache log for frequency and possible triggers  TMJ evaluation by patient's dentist-it was suggested a mouth guard. Patient should follow her dentist recommendations  Eye exam formal thru [facility]  Lab-CBC  Brain MRI for any intracranial causes  Follow up after brain MRI

## 2018-01-04 NOTE — LETTER
"2018       RE: Annette Silverman  [address]   [place] [address]     Dear Colleague,    Thank you for referring your patient, Annette Silverman, to the [facility]UROLOG[facility] at [facility]. Please see a copy of my visit note below.    Re: Annette Silverman      MRN# [id number]  YOB: 1995  Date of Visit:2018     OUTPATIENT NEUROLOGY VISIT NOTE    Chief Complaint:  Headache evaluation    History of Present Illness  Annette Silverman is a 22-year-old right-handed female presents to the clinic today for headache evaluation. Patient has seen by Dr Youssef, [facility] for chronic postnasal drainage, ear pressure and head pain.   Patient is from [place] and in Grad School PhD in political science.     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 This . Mother has chronic migraine headaches    Current Headache Pattern:      Frequency (How many headache days per month?): 2-3 times per week for about 4 month (after moving in from [place] and adjusting to Grad School).      Duration of Headache:  A couple of hours     Aura: none   Associated Symptoms: loud noise sensitivity, no nausea or vomiting, dizziness at times, no visual changes       Description of Headache Pain & Location:  Bitemporal or frontal and pressure over frontal sinuses, sometimes throbbing feeling      Level of Pain as headache is startin/10      Level of Pain during the worst headache:  5/10      Do headaches interfere with or prevent usual activities or diminish your productivity at home or work?  No, but \"continue to do things\".     Treatments Tried:  Ibuprofen 200 mg as needed and helps with the headache    Have you needed to utilize the Emergency Room to treat your headache symptoms?  If so, " "how often and when was the last time used:  No    Are Headaches worsening over time?  No    What makes your headaches better?  \"moving around\", going outside, working out and ibuprofen  Headache is not positional.   What makes your headaches worse or triggers your headaches? No specific triggers but may be seasonal allergies     OCP for 5 years and the same OCP  Sleeps is Ok but wakes up with headache. No sleep  Caffeine-one cup per day  Water intake-3-4 water bottles  Denies skipping meals  Exercises about 5 times per week-does an elliptical for an hour.     Denies history of head or neck trauma, dizziness, vertigo, loss of consciousness, seizure, double vision, blurred vision, hearing difficulty, speech or swallowing difficulty, weakness or numbness in face, arms or legs, urinary or bowel incontinence, coordination problems or gait difficulty, fever or chills.    Neurodiagnostic Testing  CT results reviewed  CT MAXILLOFACIAL W/O CONTRAST 10/4/2017 3:14 PM     Provided History: 2 MO of frontal and ethmoid sinus pain and pressure,  s/p 2 rounds of abx, Chronic sinusitis, unspecified      Comparison: None available      Technique: Using thin collimation multidetector helical acquisition  technique, axial, coronal, and sagittal thin section CT images were  reconstructed through the paranasal sinuses. Images were reviewed in  bone and soft tissue windows.     Findings:   The paranasal sinuses are clear.     The ostiomeatal units appear patent bilaterally. The bony walls of the  paranasal sinuses are intact.     Normal retromaxillary and pterygopalatine fat.     The adenoid tonsils in the nasopharynx are unremarkable.         Impression:    No evidence of sinusitis.     I have personally reviewed the examination and initial interpretation  and I agree with the findings.     RADHA YI MD           MRI brain none  Lab  Results for COLTON KEENAN (MRN [id number]) as of 1/4/2018 12:34   Ref. Range 11/22/2017 14:34 " "  TSH Latest Ref Range: 0.40 - 4.00 mU/L 2.79     Past Medical History reviewed and verified with the patient  Seasonal allergies and Zyrtec and inhaler  Otherwise healthy  Past Surgical History reviewed and verified with the patient  None  Bottom wisdom teeth removed  Family History reviewed and verified with the patient   Mother has chronic migraine headaches and hypethyroidism  No other neurological history  Social History:  Lives off campus with her BF of 2 years, BF is going to the [employer]    Social History   Substance Use Topics     Smoking status: Never Smoker     Smokeless tobacco: Never Used     Alcohol use No    reviewed and verified with the patient     Allergies   Allergen Reactions     Seasonal Allergies Other (See Comments)       Current Outpatient Prescriptions   Medication Sig Dispense Refill     beclomethasone (QVAR) 80 MCG/ACT Inhaler Inhale 2 puffs into the lungs daily 1 Inhaler 3     losartan (COZAAR) 2.5 mg/mL SUSP Take by mouth daily       norethindrone-ethinyl estradiol-iron (ESTROSTEP FE) 1-20/1-30/1-35 MG-MCG per tablet Take 1 tablet by mouth daily     reviewed and verified with the patient    Review of Systems:  A 12-point ROS including constitutional, eyes, ENT, respiratory, cardiovascular, gastroenterology, genitourinary, integumentary, musculoskeletal, neurology, hematology and psychiatric were all reviewed with the patient and completed at the Neuroscience Services Question nary and as mentioned in the HPI.     General Exam:   /75 (BP Location: Left arm, Patient Position: Sitting, Cuff Size: Adult Regular)  Pulse 84  Ht 1.575 m (5' 2\")  Wt 74.4 kg (164 lb)  BMI 30 kg/m2  GEN: Awake, NAD; good eye contact, responses appropriately   HEENT: Head atraumatic/Normocephalic. Scalp normal. TMJ tenderness and clicking bilaterally, paracervical muscle tendernss. Pupils equally round, 4 mm, reactive to light and accommodation, sclera and conjunctiva normal. Fundoscopic examination " "reveals normal vessels no apparent papilledema.   Neck: Easily moveable without resistance  Heart: S1/S2 appreciated, RRR, no m/r/g, no carotid bruits  Lungs:Lungs are clear to auscultation bilaterally, no wheezes or crackles.     Neurological Examination:  The patient is alert and oriented times four. Has good attention and concentration.Speech is fluent without dysarthria.   Cranial nerves:  CN I deferred.   CN II: Intact and full visual fields to confrontation bilaterally. CN III, IV, VI: EOM intact. There is no nystagmus. Has conjugated gaze. Intact direct and consensual pupillary light reflexes.   CN V: Intact and symmetrical to facial sensation in the V1 through V3 bilaterally.   CN VII: Intact and symmetrical eyebrow and lid raise and eyelid closure, smiles and frown.   CN VIII: Intact to finger rub bilaterally.   CN IX and X: The palates elevates symmetrical. The uvula is midline.   CN XII: The tongue protrudes midline with no atrophy or fasciculations.   Motor exam: The patient has a normal bulk and tone throughout. There is no atrophy, fasciculations, clonus, or abnormal movements appreciated.   Strength Exam:  5/5 strength at shoulder abduction, elbow flexion or extension, wrist flexion or extension, finger abduction, , hip flexion and extension, knee flexion and extension, and dorsiflexion and plantarflexion bilaterally.   Sensation is intact to light touch and pinprick throughout. Has good vibration and proprioception sensation at great toes bilaterally.   Reflexes are 2+ and symmetrical at biceps, triceps, brachioradialis, patellar, and Achilles. Negative Babinski with downgoing toes bilaterally.   Coordination reveals finger-nose-finger with normal speed and accuracy.   Station and gait is normal. There is no ataxia..Has no drift and a negative Romberg.     Assessment and Plan:  Headache frontal and bitemporal and \"always in the morning\" with current frequency 2-3 times per week and lasting 2-3 " "hours.  History of headaches for about 4 years possible triggered swimming in the 14-feet deep fish tank. Reports that headaches were 100% triggered by swimming and she would swim 4-5 times per week as part of her job requirement. Last swimming in the tank  was in August of 2017. Family history of headaches-mother has migraine headaches.   Differentials are migraine headache, tension headache, TMJ related, infectious/ or other etiology. Concern of headaches caused by swimming in the 14-feet deep fish tank and current headaches are only in the morning. Patient reports that headache is in the mornings \"always\" and alleviated with moving around  and some neck tenderness. Discussed possible headache etiology with the patient. Patient is healthy appearing and non focal neurological exam today. headache is possibly migrainous etiology but given her history of headache onset after swimming in the deep fish tank  it appears that additional evaluation is not unreasonable at this time.   Plan:  Headache log for frequency and possible triggers  TMJ evaluation by patient's dentist-it was suggested a mouth guard. Patient should follow her dentist recommendations  [facility] exam formal thru [facility]  Lab-CBC  Brain MRI for any intracranial causes  Follow up after brain MRI    I discussed all my recommendation with Annette Silverman. The patient verbalizes understanding and comfortable with the plan. The patient has our clinic phone number to call with any questions or concerns. All of the patient's questions were answered from the best of my current knowledge.     Thank you for letting me be a part of the treatment team for Annette Silverman      Time spent with pt answering questions, discussing findings, counseling and coordinating care was more than 50% the appointment time, 56  minutes.       Again, thank you for allowing me to participate in the care of your patient.      Sincerely,    DANIELLE Flores CNP      "

## 2018-01-05 NOTE — PROGRESS NOTES
Dear Annette,   Your recent blood work-complete blood cell count (CBC) results were reported as normal. Let me know if you have any questions.   Sincerely, Jacqueline

## 2018-01-13 ENCOUNTER — RADIANT APPOINTMENT (OUTPATIENT)
Dept: MRI IMAGING | Facility: CLINIC | Age: 23
End: 2018-01-13
Attending: NURSE PRACTITIONER
Payer: COMMERCIAL

## 2018-01-13 DIAGNOSIS — R51.0 POSITIONAL HEADACHE: ICD-10-CM

## 2018-01-13 DIAGNOSIS — R51.9 FRONTAL HEADACHE: ICD-10-CM

## 2018-01-13 RX ORDER — GADOBUTROL 604.72 MG/ML
7.5 INJECTION INTRAVENOUS ONCE
Status: COMPLETED | OUTPATIENT
Start: 2018-01-13 | End: 2018-01-13

## 2018-01-13 RX ADMIN — GADOBUTROL 7.5 ML: 604.72 INJECTION INTRAVENOUS at 11:19

## 2018-01-13 NOTE — DISCHARGE INSTRUCTIONS
MRI Contrast Discharge Instructions    The IV contrast you received today will pass out of your body in your  urine. This will happen in the next 24 hours. You will not feel this process.  Your urine will not change color.    Drink at least 4 extra glasses of water or juice today (unless your doctor  has restricted your fluids). This reduces the stress on your kidneys.  You may take your regular medicines.    If you are on dialysis: It is best to have dialysis today.    If you have a reaction: Most reactions happen right away. If you have  any new symptoms after leaving the hospital (such as hives or swelling),  call your hospital at the correct number below. Or call your family doctor.  If you have breathing distress or wheezing, call 911.    Special instructions: ***    I have read and understand the above information.    Signature:______________________________________ Date:___________    Staff:__________________________________________ Date:___________     Time:__________    [facility] Departments:    ___[place]: [phone]  ___[facility]: [phone]  ___[place]: [phone] ___[facility]: [phone]  ___[facility]: [phone]  ___[facility]: [phone]  ___[phone]  ___[facility]: [phone]  ___[phone]

## 2018-01-18 ENCOUNTER — CARE COORDINATION (OUTPATIENT)
Dept: NEUROLOGY | Facility: CLINIC | Age: 23
End: 2018-01-18

## 2018-01-18 NOTE — PROGRESS NOTES
Jacqueline Pruitt APRN CNP McAllister, Angela, LADAN                   I have her brain MRI results. No acute findings but nonspecific single hyperintensity findings that  I would like to review in our Clinic. Can she schedule a follow up visit or she does not want to come back.   Let me know. Thank you           1/18/18: Called patient and left voicemail message asking for a call back to discuss above information. Will await call back.

## 2018-01-18 NOTE — PROGRESS NOTES
Patient returned my phone call. I did relay Jacqueline's message regarding her brain mri. She is willing to come back in to discuss. Message sent to  to help arrange an appointment.

## 2018-01-22 NOTE — PROGRESS NOTES
Called and spoke to the patient. Due to winter weather will reschedule patient's visit to 1/30 at 1300 to discuss her brain MRI results. Patient is in agreement with the plan.     Re: Annette Silverman      MRN# [id number]  YOB: 1995  Date of Visit:     OUTPATIENT NEUROLOGY VISIT NOTE    Reason for Visit/Chief Complaint:      Interval History/History of Present Illness  Annette Silverman is a -year-old right(left)-handed presents to the clinic today for    Headache are still there and 2-3 times per week. Headache frequency about 15 per month and lasting 2-3 hours. Takes Ibuprofen which helps. Headaches are distracting but still can get her work done. Mouth guard does not make any difference and does not make headaches less frequent.   Brain MRI  OCP and migraine  Exercise 2-3 times per week.   No history of asthma      Plan:  Headache log  Vitamin B2 (riboflavin) 400 mg daily OTC.   Hydration, rest, not skipping meals, food triggers  A trial of amitriptyline 10 mg at bedtime for headache prevention. Let me know if this is something you would like to try.   If ibuprofen were not effective, we could try sumatriptan or rizatriptan for acute headache treatment. Limit ibuprofen use to no more than 2 days per week  Follow up in 6-8 weeks or sooner if needed             Past Medical History reviewed   Social History   Substance Use Topics     Smoking status: Never Smoker     Smokeless tobacco: Never Used     Alcohol use No    reviewed and verified with the patient     Allergies   Allergen Reactions     Seasonal Allergies Other (See Comments)       Current Outpatient Prescriptions   Medication Sig Dispense Refill     beclomethasone (QVAR) 80 MCG/ACT Inhaler Inhale 2 puffs into the lungs daily 1 Inhaler 3     losartan (COZAAR) 2.5 mg/mL SUSP Take by mouth daily       norethindrone-ethinyl estradiol-iron (ESTROSTEP FE) 1-20/1-30/1-35 MG-MCG per tablet Take 1 tablet by mouth daily     reviewed and verified with the  "patient    Review of Systems:   A 10-point ROS including constitutional, eyes, respiratory, cardiovascular, gastroenterology, genitourinary, integumentary, musculoskeletal, neurology and psychiatric were all negative except as mentioned in the interval history.      General Exam:   /80 (BP Location: Left arm, Patient Position: Chair, Cuff Size: Adult Regular)  Pulse 87  Ht 1.575 m (5' 2\")  Wt 75.5 kg (166 lb 6.4 oz)  BMI 30.43 kg/m2  GEN: Awake, NAD; good eye contact, responses appropriately    Speech is fluent without dysarthria. EOM intact. There is no nystagmus. Has conjugated gaze. Face is symmetrical. Intact and symmetrical eyebrow and lid raise and eyelid closure, smiles. Hearing Intact to conversation speech. The palates elevates symmetrical. The tongue protrudes midline with no atrophy or fasciculations. Strength  5/5 in the upper and lower extremities bilaterally. Sensation is intact to  touch throughout.  Reflexes symmetrical at biceps, triceps, brachioradialis, patellar, and Achilles. Negative Babinski with downgoing toes bilaterally. Coordination reveals finger-nose-finger, rapid alternating movements with normal speed and accuracy. Normal casual gait.      Assessment and Plan:           Prescription provided. Correct use and course provided. Expected benefits and typical side effects reviewed. Safety of concomitant medications and interactions reviewed. Patient taught signs and symptoms of adverse reactions and allergies. Patient understands teaching and accepts risks of prescribed medication regimen.    [place] Prescription Monitoring Program website was reviewed. No apparent concerns at this time.          I discussed all my recommendation with Annette Silverman. The patient verbalizes understanding and comfortable with the plan. The patient has our clinic phone number to call with any questions or concerns. All of the patient's questions were answered from the best of my current knowledge. Follow " up in    weeks or sooner if needed.      Thank you for letting me be a part of the treatment team for Annette Silverman      Time spent with pt answering questions, discussing findings, counseling and coordinating care was more than 50% the appointment time,   minutes.         DANIELLE Mcclain, [facility]

## 2018-01-28 ENCOUNTER — HEALTH MAINTENANCE LETTER (OUTPATIENT)
Age: 23
End: 2018-01-28

## 2018-01-30 ENCOUNTER — OFFICE VISIT (OUTPATIENT)
Dept: NEUROLOGY | Facility: CLINIC | Age: 23
End: 2018-01-30
Payer: COMMERCIAL

## 2018-01-30 VITALS
BODY MASS INDEX: 30.62 KG/M2 | WEIGHT: 166.4 LBS | SYSTOLIC BLOOD PRESSURE: 143 MMHG | HEIGHT: 62 IN | HEART RATE: 87 BPM | DIASTOLIC BLOOD PRESSURE: 80 MMHG

## 2018-01-30 DIAGNOSIS — Z09 FOLLOW UP: Primary | ICD-10-CM

## 2018-01-30 ASSESSMENT — PAIN SCALES - GENERAL: PAINLEVEL: NO PAIN (0)

## 2018-01-30 NOTE — LETTER
1/22/2018       RE: Annette Silverman  [address]   [place] [address]     Dear Colleague,    Thank you for referring your patient, Annette Silverman, to the [facility] NEUROLOGY at [facility]. Please see a copy of my visit note below.    Called and spoke to the patient. Due to winter weather will reschedule patient's visit to 1/30 at 1300 to discuss her brain MRI results. Patient is in agreement with the plan.     Again, thank you for allowing me to participate in the care of your patient.      Sincerely,    DANIELLE Flores CNP

## 2018-01-30 NOTE — MR AVS SNAPSHOT
After Visit Summary   1/30/2018    Annette Silverman    MRN: [id number]           Patient Information     Date Of Birth          1995        Visit Information        Provider Department      1/30/2018 1:00 PM Jacqueline rPuitt APRN [facility]        Today's Diagnoses     Follow up    -  1      Care Instructions    Plan:  Headache log  Vitamin B2 (riboflavin) 400 mg daily OTC.   Hydration, rest, not skipping meals, food triggers  A trial of amitriptyline 10 mg at bedtime for headache prevention. Let me know if this is something you would like to try.   If ibuprofen were not effective, we could try sumatriptan or rizatriptan for acute headache treatment. Limit ibuprofen use to no more than 2 days per week  Follow up in 6-8 weeks or sooner if needed                Follow-ups after your visit        Your next 10 appointments already scheduled     Feb 19, 2018  9:55 AM CST   (Arrive by 9:40 AM)   Return Allergy with Jason Yanes MD   [facility] ([facility])    [address] [place] [id number] [phone]           Do not take anti-histamines or Zantac for seven day prior to your appointment.              Who to contact     Please call your clinic at [phone] to:    Ask questions about your health    Make or cancel appointments    Discuss your medicines    Learn about your test results    Speak to your doctor   If you have compliments or concerns about an experience at your clinic, or if you wish to file a complaint, please contact [facility] Patient Relations at [phone] or email us at [email or web address]         Additional Information About Your Visit        MyChart Information     [employer] gives you secure access to your electronic health record. If you see a primary care provider, you can also send messages to your care team and make  "appointments. If you have questions, please call your primary care clinic.  If you do not have a primary care provider, please call [phone] and they will assist you.      [employer] is an electronic gateway that provides easy, online access to your medical records. With [employer], you can request a clinic appointment, read your test results, renew a prescription or communicate with your care team.     To access your existing account, please contact your [facility] or call [phone] for assistance.        Care EveryWhere ID     This is your Care EveryWhere ID. This could be used by other organizations to access your [facility] medical records  [id number]        Your Vitals Were     Pulse Height BMI (Body Mass Index)             87 1.575 m (5' 2\") 30.43 kg/m2          Blood Pressure from Last 3 Encounters:   01/30/18 143/80   01/04/18 126/75   11/27/17 138/79    Weight from Last 3 Encounters:   01/30/18 75.5 kg (166 lb 6.4 oz)   01/04/18 74.4 kg (164 lb)   11/27/17 68 kg (150 lb)              Today, you had the following     No orders found for display       Primary Care Provider Fax #    Physician No Ref-Primary [phone]       No address on file        Equal Access to Services     LANDY MURCIA : Hadii ronald herrerao Sohalali, waaxda luqadaha, qaybta kaalmada adeegyada, sedrick stevenson . So [facility] [phone].    ATENCIÓN: Si habla español, tiene a campos disposición servicios gratuitos de asistencia lingüística. Llame al [phone].    We comply with applicable federal civil rights laws and [place] laws. We do not discriminate on the basis of race, color, national origin, age, disability, sex, sexual orientation, or gender identity.            Thank you!     Thank you for choosing [facility]UR[facility]  for your care. Our goal is always to provide you with excellent care. Hearing back from our patients is one way we can continue to improve our services. " Please take a few minutes to complete the written survey that you may receive in the mail after your visit with us. Thank you!             Your Updated Medication List - Protect others around you: Learn how to safely use, store and throw away your medicines at www.disposemymeds.org.          This list is accurate as of 1/30/18  2:35 PM.  Always use your most recent med list.                   Brand Name Dispense Instructions for use Diagnosis    beclomethasone 80 MCG/ACT Inhaler    QVAR    1 Inhaler    Inhale 2 puffs into the lungs daily    Allergic rhinitis due to [employer] house dust mite       losartan 2.5 mg/mL Susp    COZAAR     Take by mouth daily        norethindrone-ethinyl estradiol-iron 1-20/1-30/1-35 MG-MCG per tablet    ESTROSTEP FE     Take 1 tablet by mouth daily

## 2018-01-30 NOTE — PATIENT INSTRUCTIONS
Plan:  Headache log  Vitamin B2 (riboflavin) 400 mg daily OTC.   Hydration, rest, not skipping meals, food triggers  A trial of amitriptyline 10 mg at bedtime for headache prevention. Let me know if this is something you would like to try.   If ibuprofen were not effective, we could try sumatriptan or rizatriptan for acute headache treatment. Limit ibuprofen use to no more than 2 days per week  Follow up in 6-8 weeks or sooner if needed

## 2018-02-09 NOTE — PROGRESS NOTES
Reviewed with the patient during last Neurology Clinic visit, see visit note for details. Follow up discussed-should call Neurology Clinic or return with any new symptoms. Repeat brain MRI if any new symptoms suggestive demyelinating process. Patient is aware of the results at this time.

## 2018-02-19 ENCOUNTER — OFFICE VISIT (OUTPATIENT)
Dept: PULMONOLOGY | Facility: CLINIC | Age: 23
End: 2018-02-19
Attending: ALLERGY & IMMUNOLOGY
Payer: COMMERCIAL

## 2018-02-19 VITALS
RESPIRATION RATE: 16 BRPM | HEIGHT: 67 IN | BODY MASS INDEX: 25.11 KG/M2 | WEIGHT: 160 LBS | HEART RATE: 97 BPM | SYSTOLIC BLOOD PRESSURE: 132 MMHG | DIASTOLIC BLOOD PRESSURE: 81 MMHG | OXYGEN SATURATION: 97 %

## 2018-02-19 DIAGNOSIS — J30.89 ALLERGIC RHINITIS DUE TO AMERICAN HOUSE DUST MITE: Primary | ICD-10-CM

## 2018-02-19 DIAGNOSIS — R09.81 NASAL CONGESTION: ICD-10-CM

## 2018-02-19 PROCEDURE — G0463 HOSPITAL OUTPT CLINIC VISIT: HCPCS | Mod: ZF

## 2018-02-19 RX ORDER — AZELASTINE HYDROCHLORIDE, FLUTICASONE PROPIONATE 137; 50 UG/1; UG/1
1 SPRAY, METERED NASAL 2 TIMES DAILY
Qty: 1 BOTTLE | Refills: 1 | Status: SHIPPED | OUTPATIENT
Start: 2018-02-19

## 2018-02-19 RX ORDER — CETIRIZINE HYDROCHLORIDE 10 MG/1
10 TABLET ORAL DAILY
COMMUNITY
End: 2018-02-19

## 2018-02-19 RX ORDER — CETIRIZINE HYDROCHLORIDE 10 MG/1
10 TABLET ORAL 2 TIMES DAILY
Qty: 60 TABLET | Refills: 1 | Status: SHIPPED | OUTPATIENT
Start: 2018-02-19

## 2018-02-19 ASSESSMENT — PAIN SCALES - GENERAL: PAINLEVEL: NO PAIN (0)

## 2018-02-19 NOTE — LETTER
2/19/2018       RE: Annette Silverman  [address]   [place] [facility] [address]     Dear Colleague,    Thank you for referring your patient, Annette Silverman, to the [facility] at [facility]. Please see a copy of my visit note below.    Reason for Visit  Annette Silverman is a 22 year old female who presents for follow up of allergic sinus congestion.   Allergy HPI    Annette was last seen in November. At that visit, QVAR nasal spray as well as Zyrtec daily were recommended. She also underwent household dust mite avoidance techniques, such as washing sheets frequently in hot water, and using dust mite covers. Unfortunately, these interventions have had little effect on her symptoms. She continues to complain of sinus congestion, which is mostly central. She feels the nasal spray dries her nose out more, and she will frequently blow her nose to reveal dried blood. She denies itchy/watery eyes, or chronic rhinorrhea. She currently has a bit of a URI, and has some rhinorrhea and sore throat from this, but this hasn't been typical for her. No rashes, fevers, chills. No ear pain.     Regarding headaches, she is seeing neurology. She did try a course of antibiotics, with no benefit. She recently had an MRI. She is currently taking ibuprofen as needed. She gets headaches about 3-4 times weekly, which are typically in the morning, and go away after she starts her day.     She is a never smoker. No pets here, her parents in [place] have a dog and cat, but she has not returned home since moving to [place] last summer. She is a Political Science PhD student. She has no history of asthma.     The patient was seen and examined by Benigno Talavera MD   Current Outpatient Prescriptions   Medication     azelastine-fluticasone (DYMISTA) 137-50 MCG/ACT nasal spray     cetirizine (ZYRTEC ALLERGY) 10 MG tablet     norethindrone-ethinyl estradiol-iron (ESTROSTEP FE)  "1-20/1-30/1-35 MG-MCG per tablet     No current facility-administered medications for this visit.      Allergies   Allergen Reactions     Seasonal Allergies Other (See Comments)     Social History     Social History     Marital status: Single     Spouse name: N/A     Number of children: N/A     Years of education: N/A     Occupational History     Not on file.     Social History Main Topics     Smoking status: Never Smoker     Smokeless tobacco: Never Used     Alcohol use No     Drug use: No     Sexual activity: Yes     Partners: Male     Birth control/ protection: Pill     Other Topics Concern     Not on file     Social History Narrative     Past Medical History:   Diagnosis Date     Autoimmune disease (H)     Psorasis     History reviewed. No pertinent surgical history.  Family History   Problem Relation Age of Onset     Stomach Problem Mother          ROS   A complete ROS was otherwise negative except as noted in the HPI and the end of the note.  /81  Pulse 97  Resp 16  Ht 1.702 m (5' 7\")  Wt 72.6 kg (160 lb)  SpO2 97%  BMI 25.06 kg/m2  Exam:   GENERAL APPEARANCE: Well developed, well nourished, alert, and in no apparent distress.  EYES: PERRL, EOMI, conjunctiva clear non-injected  HENT: Nasal mucosa with no edema and no discharge. No nasal polyps.  No facial tenderness.  EARS: left Canal is affected by psoriasis and congested. Right canal clear, right TM normal, unable to visualize left TM  MOUTH: Oral mucosa is moist, without any lesions, mild tonsillar enlargement, no oropharyngeal exudate. Small oropharynx with Mallampati 2.   NECK: Supple, no masses, no thyromegaly.  LYMPHATICS: No significant cervical, or supraclavicular nodes.  RESP: Good air flow throughout.  No crackles. No rhonchi. No wheezes.  CV: Normal S1, S2, regular rhythm, normal rate. No murmur.  No rub. No gallop. No LE edema.   MS: Extremities normal. No clubbing. No cyanosis.  SKIN: No rashes noted  NEURO: Speech normal, normal " strength and tone, normal gait and stance  PSYCH: Normal mentation, orientation to person, place, and time.  Results:  No new testing today    Assessment and plan:   Ms. Silverman is a 22 year old woman with a history of headaches and chronic allergic sinusitis who presents for follow up. She has allergies to dust mites, cath, dog, tree, grass, and weed pollen. She has tried QVAR nasal spray (1 spray each nostril daily) as well as Zyrtec daily for about 2 months. Unfortunately this doesn't seem to have improved her symptoms. She also tried dust mite prevention measures without much benefit. Current symptoms are most likely due to dust mites, but she did have a pollen/weed exacerbation of symptoms last August as well. At this point, she would like to try additional nasal spray therapy prior to considering allergy shots, but is still considering these in the future. We will stop QVAR, and start Dymista (azelastine/fluticasone) 1 spray each nostril daily, and increase Zyrtec to 10 mg BID. If insurance will not cover Dymista, we will prescribe separate azelastine and fluticasone nasal sprays to be used concurrently. Of note, if morning headaches continue, could consider evaluation for obstructive sleep apnea given crowded oropharynx.   Follow up in 2 months.   Seen and discussed with Dr. Yanes.   Benigno Talavera MD  Pulmonary and Critical Care Fellow  Pager: [phone]           Physician Attestation   I, Jason Yanes, saw this patient with the resident and agree with the resident s findings and plan of care as documented in the resident s note.          Jason Yanes  Date of Service (when I saw the patient): 02/19/18

## 2018-02-19 NOTE — PATIENT INSTRUCTIONS
Increase Zyrtec to 1 tab twice daily (if too sleepy in AM, can take 2 tabs at night instead)    Start Azelastine/fluticasone (Dymista) nasal spray 1 puff twice daily in each nostril    Stop QVAR nasal spray.    If insurance wont cover dymista, we can prescribe fluticasone and azelastine sprays separately.     Follow up in 2 months.

## 2018-02-19 NOTE — PROGRESS NOTES
Reason for Visit  Annette Silverman is a 22 year old female who presents for follow up of allergic sinus congestion.   Allergy HPI    Annette was last seen in November. At that visit, QVAR nasal spray as well as Zyrtec daily were recommended. She also underwent household dust mite avoidance techniques, such as washing sheets frequently in hot water, and using dust mite covers. Unfortunately, these interventions have had little effect on her symptoms. She continues to complain of sinus congestion, which is mostly central. She feels the nasal spray dries her nose out more, and she will frequently blow her nose to reveal dried blood. She denies itchy/watery eyes, or chronic rhinorrhea. She currently has a bit of a URI, and has some rhinorrhea and sore throat from this, but this hasn't been typical for her. No rashes, fevers, chills. No ear pain.     Regarding headaches, she is seeing neurology. She did try a course of antibiotics, with no benefit. She recently had an MRI. She is currently taking ibuprofen as needed. She gets headaches about 3-4 times weekly, which are typically in the morning, and go away after she starts her day.     She is a never smoker. No pets here, her parents in [place] have a dog and cat, but she has not returned home since moving to [place] last summer. She is a Political Science PhD student. She has no history of asthma.     The patient was seen and examined by Benigno Talavera MD   Current Outpatient Prescriptions   Medication     azelastine-fluticasone (DYMISTA) 137-50 MCG/ACT nasal spray     cetirizine (ZYRTEC ALLERGY) 10 MG tablet     norethindrone-ethinyl estradiol-iron (ESTROSTEP FE) 1-20/1-30/1-35 MG-MCG per tablet     No current facility-administered medications for this visit.      Allergies   Allergen Reactions     Seasonal Allergies Other (See Comments)     Social History     Social History     Marital status: Single     Spouse name: N/A     Number of children: N/A     Years of  "education: N/A     Occupational History     Not on file.     Social History Main Topics     Smoking status: Never Smoker     Smokeless tobacco: Never Used     Alcohol use No     Drug use: No     Sexual activity: Yes     Partners: Male     Birth control/ protection: Pill     Other Topics Concern     Not on file     Social History Narrative     Past Medical History:   Diagnosis Date     Autoimmune disease (H)     Psorasis     History reviewed. No pertinent surgical history.  Family History   Problem Relation Age of Onset     Stomach Problem Mother          ROS   A complete ROS was otherwise negative except as noted in the HPI and the end of the note.  /81  Pulse 97  Resp 16  Ht 1.702 m (5' 7\")  Wt 72.6 kg (160 lb)  SpO2 97%  BMI 25.06 kg/m2  Exam:   GENERAL APPEARANCE: Well developed, well nourished, alert, and in no apparent distress.  EYES: PERRL, EOMI, conjunctiva clear non-injected  HENT: Nasal mucosa with no edema and no discharge. No nasal polyps.  No facial tenderness.  EARS: left Canal is affected by psoriasis and congested. Right canal clear, right TM normal, unable to visualize left TM  MOUTH: Oral mucosa is moist, without any lesions, mild tonsillar enlargement, no oropharyngeal exudate. Small oropharynx with Mallampati 2.   NECK: Supple, no masses, no thyromegaly.  LYMPHATICS: No significant cervical, or supraclavicular nodes.  RESP: Good air flow throughout.  No crackles. No rhonchi. No wheezes.  CV: Normal S1, S2, regular rhythm, normal rate. No murmur.  No rub. No gallop. No LE edema.   MS: Extremities normal. No clubbing. No cyanosis.  SKIN: No rashes noted  NEURO: Speech normal, normal strength and tone, normal gait and stance  PSYCH: Normal mentation, orientation to person, place, and time.  Results:  No new testing today    Assessment and plan:   Ms. Silverman is a 22 year old woman with a history of headaches and chronic allergic sinusitis who presents for follow up. She has allergies to " dust mites, cath, dog, tree, grass, and weed pollen. She has tried QVAR nasal spray (1 spray each nostril daily) as well as Zyrtec daily for about 2 months. Unfortunately this doesn't seem to have improved her symptoms. She also tried dust mite prevention measures without much benefit. Current symptoms are most likely due to dust mites, but she did have a pollen/weed exacerbation of symptoms last August as well. At this point, she would like to try additional nasal spray therapy prior to considering allergy shots, but is still considering these in the future. We will stop QVAR, and start Dymista (azelastine/fluticasone) 1 spray each nostril daily, and increase Zyrtec to 10 mg BID. If insurance will not cover Dymista, we will prescribe separate azelastine and fluticasone nasal sprays to be used concurrently. Of note, if morning headaches continue, could consider evaluation for obstructive sleep apnea given crowded oropharynx.   Follow up in 2 months.   Seen and discussed with Dr. Yanes.   Benigno Talavera MD  Pulmonary and Critical Care Fellow  Pager: [phone]           Physician Attestation   I, Jason Yanes, saw this patient with the resident and agree with the resident s findings and plan of care as documented in the resident s note.          Jason Yanes  Date of Service (when I saw the patient): 02/19/18

## 2018-02-19 NOTE — MR AVS SNAPSHOT
After Visit Summary   2/19/2018    Annette Silverman    MRN: [id number]           Patient Information     Date Of Birth          1995        Visit Information        Provider Department      2/19/2018 9:55 AM Jason Yanes MD [facility]        Today's Diagnoses     Allergic rhinitis due to American house dust mite    -  1    Nasal congestion          Care Instructions    Increase Zyrtec to 1 tab twice daily (if too sleepy in AM, can take 2 tabs at night instead)    Start Azelastine/fluticasone (Dymista) nasal spray 1 puff twice daily in each nostril    Stop QVAR nasal spray.    If insurance wont cover dymista, we can prescribe fluticasone and azelastine sprays separately.     Follow up in 2 months.           Follow-ups after your visit        Follow-up notes from your care team     Return in about 2 months (around 4/19/2018).      Your next 10 appointments already scheduled     Apr 23, 2018  9:55 AM CDT   (Arrive by 9:40 AM)   Return Allergy with Jason Yanes MD   [facility] ([facility])    [address] [place] [id number] [phone]           Do not take anti-histamines or Zantac for seven day prior to your appointment.              Who to contact     If you have questions or need follow up information about today's clinic visit or your schedule please contact [facility] directly at [phone].  Normal or non-critical lab and imaging results will be communicated to you by MyChart, letter or phone within 4 business days after the clinic has received the results. If you do not hear from us within 7 days, please contact the clinic through MyChart or phone. If you have a critical or abnormal lab result, we will notify you by phone as soon as possible.  Submit refill requests through [employer] or call your pharmacy and they will  "forward the refill request to us. Please allow 3 business days for your refill to be completed.          Additional Information About Your Visit        [employer] [employer] Information     [employer] gives you secure access to your electronic health record. If you see a primary care provider, you can also send messages to your care team and make appointments. If you have questions, please call your primary care clinic.  If you do not have a primary care provider, please call [phone] and they will assist you.        Care EveryWhere ID     This is your Care EveryWhere ID. This could be used by other organizations to access your [facility] medical records  [id number]        Your Vitals Were     Pulse Respirations Height Pulse Oximetry BMI (Body Mass Index)       97 16 1.702 m (5' 7\") 97% 25.06 kg/m2        Blood Pressure from Last 3 Encounters:   02/19/18 132/81   01/30/18 143/80   01/04/18 126/75    Weight from Last 3 Encounters:   02/19/18 72.6 kg (160 lb)   01/30/18 75.5 kg (166 lb 6.4 oz)   01/04/18 74.4 kg (164 lb)              Today, you had the following     No orders found for display         Today's Medication Changes          These changes are accurate as of 2/19/18 12:02 PM.  If you have any questions, ask your nurse or doctor.               Start taking these medicines.        Dose/Directions    azelastine-fluticasone 137-50 MCG/ACT nasal spray   Commonly known as:  DYMISTA   Used for:  Allergic rhinitis due to American house dust mite, Nasal congestion   Started by:  Jason Yanes MD        Dose:  1 spray   Spray 1 spray into both nostrils 2 times daily   Quantity:  1 Bottle   Refills:  1         These medicines have changed or have updated prescriptions.        Dose/Directions    cetirizine 10 MG tablet   Commonly known as:  ZYRTEC ALLERGY   This may have changed:  when to take this   Used for:  Allergic rhinitis due to American house dust mite, Nasal congestion   Changed by:  Jason Yanes, " MD        Dose:  10 mg   Take 1 tablet (10 mg) by mouth 2 times daily   Quantity:  60 tablet   Refills:  1         Stop taking these medicines if you haven't already. Please contact your care team if you have questions.     beclomethasone 80 MCG/ACT Inhaler   Commonly known as:  QVAR   Stopped by:  Jason Yanes MD                Where to get your medicines      These medications were sent to [address] [address]     Phone:  [phone]     azelastine-fluticasone 137-50 MCG/ACT nasal spray    cetirizine 10 MG tablet                Primary Care Provider Fax #    Physician No Ref-Primary [phone]       No address on file        Equal Access to Services     LANDY MURCIA : Mat Corbett, waangie powers, qaybta kaalmada garry, sedrick angela. So [facility] [phone].    ATENCIÓN: Si habla español, tiene a campos disposición servicios gratuitos de asistencia lingüística. [facility] [phone].    We comply with applicable federal civil rights laws and [place] laws. We do not discriminate on the basis of race, color, national origin, age, disability, sex, sexual orientation, or gender identity.            Thank you!     Thank you for choosing [facility]  for your care. Our goal is always to provide you with excellent care. Hearing back from our patients is one way we can continue to improve our services. Please take a few minutes to complete the written survey that you may receive in the mail after your visit with us. Thank you!             Your Updated Medication List - Protect others around you: Learn how to safely use, store and throw away your medicines at www.disposemymeds.org.          This list is accurate as of 2/19/18 12:02 PM.  Always use your most recent med list.                   Brand Name Dispense Instructions for use Diagnosis     azelastine-fluticasone 137-50 MCG/ACT nasal spray    DYMISTA    1 Bottle    Spray 1 spray into both nostrils 2 times daily    Allergic rhinitis due to American house dust mite, Nasal congestion       cetirizine 10 MG tablet    ZYRTEC ALLERGY    60 tablet    Take 1 tablet (10 mg) by mouth 2 times daily    Allergic rhinitis due to American house dust mite, Nasal congestion       norethindrone-ethinyl estradiol-iron 1-20/1-30/1-35 MG-MCG per tablet    ESTROSTEP FE     Take 1 tablet by mouth daily

## 2018-04-23 ENCOUNTER — OFFICE VISIT (OUTPATIENT)
Dept: PULMONOLOGY | Facility: CLINIC | Age: 23
End: 2018-04-23
Attending: ALLERGY & IMMUNOLOGY
Payer: COMMERCIAL

## 2018-04-23 VITALS
HEART RATE: 96 BPM | SYSTOLIC BLOOD PRESSURE: 137 MMHG | DIASTOLIC BLOOD PRESSURE: 83 MMHG | RESPIRATION RATE: 16 BRPM | OXYGEN SATURATION: 97 %

## 2018-04-23 DIAGNOSIS — J30.1 CHRONIC SEASONAL ALLERGIC RHINITIS DUE TO POLLEN: Primary | ICD-10-CM

## 2018-04-23 PROCEDURE — G0463 HOSPITAL OUTPT CLINIC VISIT: HCPCS | Mod: ZF

## 2018-04-23 RX ORDER — AZELASTINE HYDROCHLORIDE, FLUTICASONE PROPIONATE 137; 50 UG/1; UG/1
1 SPRAY, METERED NASAL 2 TIMES DAILY
Qty: 1 BOTTLE | Refills: 4 | Status: SHIPPED | OUTPATIENT
Start: 2018-04-23

## 2018-04-23 ASSESSMENT — PAIN SCALES - GENERAL: PAINLEVEL: NO PAIN (0)

## 2018-04-23 NOTE — NURSING NOTE
Chief Complaint   Patient presents with     Allergy Consult     Patient is being seen for follow up consulation of allergies      Cuca Hunt CMA at 10:03 AM on 4/23/2018

## 2018-04-23 NOTE — PATIENT INSTRUCTIONS
We will continue fluticasone with azelastine and then an allergy pill daily.  We will consider allergy shots.  Code to make serum is 03623 and may be up to 100 units.  Shot code is 85796 each week.        IMMUNOTHERAPY (ALLERGY SHOTS)  General Information    What are allergy shots and what can they do for me?  --Allergy shots (otherwise known as immunotherapy or desensitization) help make your immune system less sensitive to the things that cause your allergy symptoms.    Should I have allergy shots?  --MAYBE--If you are allergic to things that are unavoidable such as trees, grasses, weeds, molds, dust or animals.  --MAYBE--If you have to take medicine(s) more often than not to control your allergies; OR despite your medicines, you are still having allergy symptoms.  --MAYBE--If your allergies are interfering with daily activities.    What can allergy shots do for me?  --Eventually, you may no longer need your allergy medicine. You should have fewer or milder allergy symptoms. You may no longer have allergy symptoms. You may not have to visit your health care provider as often.     Can allergy shots really help?  --The success rate for allergy shots is high ~75-85%. Many people consider themselves  cured , but some will suffer a recurrence. Some people will require shots for longer than 5 years, but most people who complete the allergy shot program do not need to take shots again. Typically immunotherapy is complete around 3 years, but it depends on how well the doseages are tolerated, but the doctor will decide.    How do allergy shots work?  --Allergy shots result in your body making  blocking IgG  antibodies to your  allergy IgE  antibodies. This makes you react less to the things that cause your allergy symptoms. If your allergies are  blocked , you do not release histamines or other allergy mediators that cause your allergy symptoms.     How long before allergy shots start to work?  --Allergy shots may start  to work in four to six months. For some people it may take a year. If there is no improvement after two years, discuss this with your doctor or nurse practitioner. The typical maximum benefit from allergy shots usually occurs within the first 1-2 years after reaching and adequate maintenance dose.     Can you still take your other medications?  --The goal of the allergy shots is to allow you to feel as good as possible, with as little medication as possible, and with as few shots as possible. You may still need to take your medications until the allergy shots begin to work.     How much do allergy shots cost?  --Allergy shots may be somewhat costly the first year when more frequent allergy injections are needed. The vial of allergy extract is a separate cost from the actual injection. Your insurance is billed when the allergy vial of extract is made and the injection cost is billed when the shot is administered. Coverage of the cost by insurance plans and HMO varies. In succeeding years, when the injections are spread out to 2-8 weeks, the cost dramatically decreases. Allergy shots may save you money, as you will not require as much medicine, and as you will decrease the number of visits to your health care provider.    Are allergy shots painful?  --The needle used to give shots is very small and thin. The shot is given in the fatty part of the arm, not into the muscle. Most people say that when the allergy shot is given that it does not hurt.     Are allergy shots risky?  --Allergic reactions are rare. Reactions can occur because you are receiving small amounts of what you are allergic to. The most serious reactions occur within 30 minutes of the injection.       LOCAL REACTIONS:  Some swelling and/or redness may occur in the first twenty minutes. If there is no discomfort, this may be ignored. If there is a local reaction (the size of a twenty-five cent piece), or a hive which lasts longer than 24 hours, please  notifiy us before receiving the next injection. Occasionally, you may develop a delayed local reaction between 4-48 hours. These reactions require no treatment, but may be counteracted by using a cool compress and by taking a dose of an antihistamine, plus Tylenol (Acetaminophen) for pain.  SYSTEMIC REACTIONS: This type of reaction is more serious. It can consist of symptoms of throat tightening, difficulty breathing, fainting, vomiting, or hives, amongst others. If you are still in the office/clinic, immediately notify a health care provider. If you have left the office/clinic, seek immediate health care assistance. Systemic reactions require immediate treatment and notification of our office. Treatment of systemic reactions requires the administration of adrenaline (epinephrine) and evaluation by a physician. Please call our office at [phone] ext. [phone] to speak to a nurse during business hours. If after hours please go to the ER.      **EVERYONE MUST WAIT 30-MINUTES AFTER EACH ALLERGY SHOT IN CASE OF A REACTION!!**                        *All information has been reviewed, updated and approved by:  Dr. Jason [facility]anes[facility] updated: 1/2017

## 2018-04-23 NOTE — LETTER
4/23/2018       RE: Annette Silverman  [address]   [place] [address]     Dear Colleague,    Thank you for referring your patient, Annette Silverman, to the [facility] at [facility]. Please see a copy of my visit note below.    Reason for Visit  Annette Silverman is a 23 year old female who is here for follow-up for allergies.    Allergy HPI    She is still frequently stuffy and the azelastine helps right away and then doesn't work anymore.  She has been stuffy for a long time.    The patient was seen and examined by Jason Lira MD   Current Outpatient Prescriptions   Medication     norethindrone-ethinyl estradiol-iron (ESTROSTEP FE) 1-20/1-30/1-35 MG-MCG per tablet     azelastine-fluticasone (DYMISTA) 137-50 MCG/ACT nasal spray     cetirizine (ZYRTEC ALLERGY) 10 MG tablet     No current facility-administered medications for this visit.      Allergies   Allergen Reactions     Seasonal Allergies Other (See Comments)     Social History     Social History     Marital status: Single     Spouse name: N/A     Number of children: N/A     Years of education: N/A     Occupational History     Not on file.     Social History Main Topics     Smoking status: Never Smoker     Smokeless tobacco: Never Used     Alcohol use No     Drug use: No     Sexual activity: Yes     Partners: Male     Birth control/ protection: Pill     Other Topics Concern     Not on file     Social History Narrative     Past Medical History:   Diagnosis Date     Autoimmune disease (H)     Psorasis     No past surgical history on file.  Family History   Problem Relation Age of Onset     Stomach Problem Mother          ROS   A complete ROS was otherwise negative except as noted in the HPI.  /83 (BP Location: Right arm, Patient Position: Chair, Cuff Size: Adult Regular)  Pulse 96  Resp 16  SpO2 97%  Exam:   GENERAL APPEARANCE: Well developed, well nourished,  alert, and in no apparent distress.  EYES: EOMI, conjunctiva clear non-injected  HENT: Nasal mucosa with no edema and no discharge. No nasal polyps.    EARS: Canals clear, TMs normal.  MOUTH: Oral mucosa is moist, without any lesions, no tonsillar enlargement, no oropharyngeal exudate.  RESP: Good air flow throughout.  No crackles. No rhonchi. No wheezes.  CV: Normal S1, S2, regular rhythm, normal rate. No murmur.  No rub. No gallop. No LE edema.   MS: Extremities normal. No clubbing. No cyanosis.  SKIN: No rashes noted.  NEURO: Speech normal, normal strength and tone, normal gait and stance  PSYCH: Normal mentation, orientation to person, place, and time.  Results:      Assessment and plan: We reviewed a sinus CT from October that was essentially normal (only 1 frontal sinus).  She has not liked sinus rinses.  She did not respond well enough to Qvar in the nose and just OK with dymista.  We will continue fluticasone with azelastine and then an allergy pill daily.  We will consider allergy shots.  Code to make serum is 31041 and may be up to 100 units.  Shot code is 89916 each week.                Again, thank you for allowing me to participate in the care of your patient.      Sincerely,    Jason Lira MD

## 2018-04-23 NOTE — PROGRESS NOTES
Reason for Visit  Annette Silverman is a 23 year old female who is here for follow-up for allergies.    Allergy HPI    She is still frequently stuffy and the azelastine helps right away and then doesn't work anymore.  She has been stuffy for a long time.    The patient was seen and examined by Jason Lira MD   Current Outpatient Prescriptions   Medication     norethindrone-ethinyl estradiol-iron (ESTROSTEP FE) 1-20/1-30/1-35 MG-MCG per tablet     azelastine-fluticasone (DYMISTA) 137-50 MCG/ACT nasal spray     cetirizine (ZYRTEC ALLERGY) 10 MG tablet     No current facility-administered medications for this visit.      Allergies   Allergen Reactions     Seasonal Allergies Other (See Comments)     Social History     Social History     Marital status: Single     Spouse name: N/A     Number of children: N/A     Years of education: N/A     Occupational History     Not on file.     Social History Main Topics     Smoking status: Never Smoker     Smokeless tobacco: Never Used     Alcohol use No     Drug use: No     Sexual activity: Yes     Partners: Male     Birth control/ protection: Pill     Other Topics Concern     Not on file     Social History Narrative     Past Medical History:   Diagnosis Date     Autoimmune disease (H)     Psorasis     No past surgical history on file.  Family History   Problem Relation Age of Onset     Stomach Problem Mother          ROS   A complete ROS was otherwise negative except as noted in the HPI.  /83 (BP Location: Right arm, Patient Position: Chair, Cuff Size: Adult Regular)  Pulse 96  Resp 16  SpO2 97%  Exam:   GENERAL APPEARANCE: Well developed, well nourished, alert, and in no apparent distress.  EYES: EOMI, conjunctiva clear non-injected  HENT: Nasal mucosa with no edema and no discharge. No nasal polyps.    EARS: Canals clear, TMs normal.  MOUTH: Oral mucosa is moist, without any lesions, no tonsillar enlargement, no oropharyngeal exudate.  RESP: Good air flow  throughout.  No crackles. No rhonchi. No wheezes.  CV: Normal S1, S2, regular rhythm, normal rate. No murmur.  No rub. No gallop. No LE edema.   MS: Extremities normal. No clubbing. No cyanosis.  SKIN: No rashes noted.  NEURO: Speech normal, normal strength and tone, normal gait and stance  PSYCH: Normal mentation, orientation to person, place, and time.  Results:      Assessment and plan: We reviewed a sinus CT from October that was essentially normal (only 1 frontal sinus).  She has not liked sinus rinses.  She did not respond well enough to Qvar in the nose and just OK with dymista.  We will continue fluticasone with azelastine and then an allergy pill daily.  We will consider allergy shots.  Code to make serum is 67012 and may be up to 100 units.  Shot code is 22264 each week.

## 2018-04-23 NOTE — MR AVS SNAPSHOT
After Visit Summary   4/23/2018    Annette Silverman    MRN: [id number]           Patient Information     Date Of Birth          1995        Visit Information        Provider Department      4/23/2018 9:55 AM Jason Yanes MD [facility]        Today's Diagnoses     Chronic seasonal allergic rhinitis due to pollen    -  1      Care Instructions      We will continue fluticasone with azelastine and then an allergy pill daily.  We will consider allergy shots.  Code to make serum is 46078 and may be up to 100 units.  Shot code is 86741 each week.        IMMUNOTHERAPY (ALLERGY SHOTS)  General Information    What are allergy shots and what can they do for me?  --Allergy shots (otherwise known as immunotherapy or desensitization) help make your immune system less sensitive to the things that cause your allergy symptoms.    Should I have allergy shots?  --MAYBE--If you are allergic to things that are unavoidable such as trees, grasses, weeds, molds, dust or animals.  --MAYBE--If you have to take medicine(s) more often than not to control your allergies; OR despite your medicines, you are still having allergy symptoms.  --MAYBE--If your allergies are interfering with daily activities.    What can allergy shots do for me?  --Eventually, you may no longer need your allergy medicine. You should have fewer or milder allergy symptoms. You may no longer have allergy symptoms. You may not have to visit your health care provider as often.     Can allergy shots really help?  --The success rate for allergy shots is high ~75-85%. Many people consider themselves  cured , but some will suffer a recurrence. Some people will require shots for longer than 5 years, but most people who complete the allergy shot program do not need to take shots again. Typically immunotherapy is complete around 3 years, but it depends on how well the doseages are tolerated, but the doctor will  decide.    How do allergy shots work?  --Allergy shots result in your body making  blocking IgG  antibodies to your  allergy IgE  antibodies. This makes you react less to the things that cause your allergy symptoms. If your allergies are  blocked , you do not release histamines or other allergy mediators that cause your allergy symptoms.     How long before allergy shots start to work?  --Allergy shots may start to work in four to six months. For some people it may take a year. If there is no improvement after two years, discuss this with your doctor or nurse practitioner. The typical maximum benefit from allergy shots usually occurs within the first 1-2 years after reaching and adequate maintenance dose.     Can you still take your other medications?  --The goal of the allergy shots is to allow you to feel as good as possible, with as little medication as possible, and with as few shots as possible. You may still need to take your medications until the allergy shots begin to work.     How much do allergy shots cost?  --Allergy shots may be somewhat costly the first year when more frequent allergy injections are needed. The vial of allergy extract is a separate cost from the actual injection. Your insurance is billed when the allergy vial of extract is made and the injection cost is billed when the shot is administered. Coverage of the cost by insurance plans and HMO varies. In succeeding years, when the injections are spread out to 2-8 weeks, the cost dramatically decreases. Allergy shots may save you money, as you will not require as much medicine, and as you will decrease the number of visits to your health care provider.    Are allergy shots painful?  --The needle used to give shots is very small and thin. The shot is given in the fatty part of the arm, not into the muscle. Most people say that when the allergy shot is given that it does not hurt.     Are allergy shots risky?  --Allergic reactions are rare.  Reactions can occur because you are receiving small amounts of what you are allergic to. The most serious reactions occur within 30 minutes of the injection.       LOCAL REACTIONS:  Some swelling and/or redness may occur in the first twenty minutes. If there is no discomfort, this may be ignored. If there is a local reaction (the size of a twenty-five cent piece), or a hive which lasts longer than 24 hours, please notifiy us before receiving the next injection. Occasionally, you may develop a delayed local reaction between 4-48 hours. These reactions require no treatment, but may be counteracted by using a cool compress and by taking a dose of an antihistamine, plus Tylenol (Acetaminophen) for pain.  SYSTEMIC REACTIONS: This type of reaction is more serious. It can consist of symptoms of throat tightening, difficulty breathing, fainting, vomiting, or hives, amongst others. If you are still in the office/clinic, immediately notify a health care provider. If you have left the office/clinic, seek immediate health care assistance. Systemic reactions require immediate treatment and notification of our office. Treatment of systemic reactions requires the administration of adrenaline (epinephrine) and evaluation by a physician. Please call our office at [phone] ext. [phone] to speak to a nurse during business hours. If after hours please go to the ER.      **EVERYONE MUST WAIT 30-MINUTES AFTER EACH ALLERGY SHOT IN CASE OF A REACTION!!**                        *All information has been reviewed, updated and approved by:  Dr. Jason Yanes[facility] updated: 1/2017            Follow-ups after your visit        Follow-up notes from your care team     Return in about 4 months (around 8/23/2018).      Your next 10 appointments already scheduled     Jun 19, 2018  8:15 AM CDT   (Arrive by 8:00 AM)   New Patient Visit with Vita Naik MD   [facility] Dermatology ([facility]  [place])    [address] [address] [phone]              Who to contact     If you have questions or need follow up information about today's clinic visit or your schedule please contact [facility] directly at [phone].  Normal or non-critical lab and imaging results will be communicated to you by MyChart, letter or phone within 4 business days after the clinic has received the results. If you do not hear from us within 7 days, please contact the clinic through [employer] or phone. If you have a critical or abnormal lab result, we will notify you by phone as soon as possible.  Submit refill requests through [employer] or call your pharmacy and they will forward the refill request to us. Please allow 3 business days for your refill to be completed.          Additional Information About Your Visit        MyChart Information     [employer] gives you secure access to your electronic health record. If you see a primary care provider, you can also send messages to your care team and make appointments. If you have questions, please call your primary care clinic.  If you do not have a primary care provider, please call [phone] and they will assist you.        Care EveryWhere ID     This is your Care EveryWhere ID. This could be used by other organizations to access your [place] medical records  [id number]        Your Vitals Were     Pulse Respirations Pulse Oximetry             96 16 97%          Blood Pressure from Last 3 Encounters:   04/23/18 137/83   02/19/18 132/81   01/30/18 143/80    Weight from Last 3 Encounters:   02/19/18 72.6 kg (160 lb)   01/30/18 75.5 kg (166 lb 6.4 oz)   01/04/18 74.4 kg (164 lb)              Today, you had the following     No orders found for display         Today's Medication Changes          These changes are accurate as of 4/23/18 10:24 AM.  If you have any questions, ask your nurse or doctor.               These  medicines have changed or have updated prescriptions.        Dose/Directions    * azelastine-fluticasone 137-50 MCG/ACT nasal spray   Commonly known as:  DYMISTA   This may have changed:  Another medication with the same name was added. Make sure you understand how and when to take each.   Used for:  Allergic rhinitis due to American house dust mite, Nasal congestion   Changed by:  Jason Yanes MD        Dose:  1 spray   Spray 1 spray into both nostrils 2 times daily   Quantity:  1 Bottle   Refills:  1       * azelastine-fluticasone 137-50 MCG/ACT nasal spray   Commonly known as:  DYMISTA   This may have changed:  You were already taking a medication with the same name, and this prescription was added. Make sure you understand how and when to take each.   Used for:  Chronic seasonal allergic rhinitis due to pollen   Changed by:  Jason Yanes MD        Dose:  1 spray   Spray 1 spray into both nostrils 2 times daily   Quantity:  1 Bottle   Refills:  4       * Notice:  This list has 2 medication(s) that are the same as other medications prescribed for you. Read the directions carefully, and ask your doctor or other care provider to review them with you.         Where to get your medicines      These medications were sent to [address] [address]     Phone:  [phone]     azelastine-fluticasone 137-50 MCG/ACT nasal spray                Primary Care Provider Fax #    Physician No Ref-Primary [phone]       No address on file        Equal Access to Services     LANDY MURCIA AH: Hadii ronald herrerao Somarva, waaxda luqadaha, qaybta kaalmada adekarleyyaashok, sedrick angela. So [facility] [phone].    ATENCIÓN: Si habla español, tiene a campos disposición servicios gratuitos de asistencia lingüística. Llame al [phone].    We comply with applicable federal civil rights laws and [place] laws.  We do not discriminate on the basis of race, color, national origin, age, disability, sex, sexual orientation, or gender identity.            Thank you!     Thank you for choosing [facility]  for your care. Our goal is always to provide you with excellent care. Hearing back from our patients is one way we can continue to improve our services. Please take a few minutes to complete the written survey that you may receive in the mail after your visit with us. Thank you!             Your Updated Medication List - Protect others around you: Learn how to safely use, store and throw away your medicines at www.disposemymeds.org.          This list is accurate as of 4/23/18 10:24 AM.  Always use your most recent med list.                   Brand Name Dispense Instructions for use Diagnosis    * azelastine-fluticasone 137-50 MCG/ACT nasal spray    DYMISTA    1 Bottle    Spray 1 spray into both nostrils 2 times daily    Allergic rhinitis due to American house dust mite, Nasal congestion       * azelastine-fluticasone 137-50 MCG/ACT nasal spray    DYMISTA    1 Bottle    Spray 1 spray into both nostrils 2 times daily    Chronic seasonal allergic rhinitis due to pollen       cetirizine 10 MG tablet    ZYRTEC ALLERGY    60 tablet    Take 1 tablet (10 mg) by mouth 2 times daily    Allergic rhinitis due to American house dust mite, Nasal congestion       norethindrone-ethinyl estradiol-iron 1-20/1-30/1-35 MG-MCG per tablet    ESTROSTEP FE     Take 1 tablet by mouth daily        * Notice:  This list has 2 medication(s) that are the same as other medications prescribed for you. Read the directions carefully, and ask your doctor or other care provider to review them with you.

## 2018-05-11 ENCOUNTER — TELEPHONE (OUTPATIENT)
Dept: PULMONOLOGY | Facility: CLINIC | Age: 23
End: 2018-05-11

## 2018-05-11 NOTE — TELEPHONE ENCOUNTER
Left voicemail with patient to determine if she is using Qvar. Received fax for refill of Qvar Redihaler as Qvar is no longer manufactured. Provider note states that Qvar was not very helpful for patient.

## 2018-06-12 ENCOUNTER — TELEPHONE (OUTPATIENT)
Dept: DERMATOLOGY | Facility: CLINIC | Age: 23
End: 2018-06-12

## 2018-06-12 ENCOUNTER — MEDICAL CORRESPONDENCE (OUTPATIENT)
Dept: HEALTH INFORMATION MANAGEMENT | Facility: CLINIC | Age: 23
End: 2018-06-12

## 2018-06-12 NOTE — TELEPHONE ENCOUNTER
Left message for patinet reminding of appointment date and time. Asked that they bring an updated list of medications and any records pertaining to this visit.     Clinic number provided to call back in case this appointment needs to be canceled.

## 2018-06-19 ENCOUNTER — OFFICE VISIT (OUTPATIENT)
Dept: DERMATOLOGY | Facility: CLINIC | Age: 23
End: 2018-06-19
Payer: COMMERCIAL

## 2018-06-19 DIAGNOSIS — L40.9 PSORIASIS: Primary | ICD-10-CM

## 2018-06-19 RX ORDER — FLUOCINOLONE ACETONIDE 0.11 MG/ML
OIL TOPICAL
Qty: 120 ML | Refills: 6 | Status: SHIPPED | OUTPATIENT
Start: 2018-06-19

## 2018-06-19 ASSESSMENT — PAIN SCALES - GENERAL: PAINLEVEL: NO PAIN (0)

## 2018-06-19 NOTE — NURSING NOTE
Dermatology Rooming Note    Annette Silverman's goals for this visit include:   Chief Complaint   Patient presents with     Derm Problem     Annette is here for concerns of psoriasis that she's had since she was 8, and currently is effecting her scalp and left ear.     Santa Ryan LPN

## 2018-06-19 NOTE — PROGRESS NOTES
[facility] Dermatology Note      Dermatology Problem List:  1.Psoriasis    CC:   Chief Complaint   Patient presents with     Derm Problem     Annette is here for concerns of psoriasis that she's had since she was 8, and currently is effecting her scalp and left ear.         Encounter Date: Jun 19, 2018    History of Present Illness:  Ms. Annette Silverman is a 23 year old female who presents for evaluation of psoriasis.  Originally diagnosed at age 8 she has been on and off topical steroids since that time.  No systemic medications.  Scalp is always involved but other body sites include face and extremities.  No facial involvement for several years.  No morning stiffness or joint pain.  She is currently using Neutragena T gel shampoo BIW and used an old tube of clobetasol on a plaque of the hand which quickly resolved.     Past Medical History:   There is no problem list on file for this patient.    Past Medical History:   Diagnosis Date     Autoimmune disease (H)     Psorasis     No past surgical history on file.    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  Originally from [place].     Family History:  Grandma psoriasis    Medications:  Current Outpatient Prescriptions   Medication Sig Dispense Refill     azelastine-fluticasone (DYMISTA) 137-50 MCG/ACT nasal spray Spray 1 spray into both nostrils 2 times daily 1 Bottle 4     azelastine-fluticasone (DYMISTA) 137-50 MCG/ACT nasal spray Spray 1 spray into both nostrils 2 times daily 1 Bottle 1     cetirizine (ZYRTEC ALLERGY) 10 MG tablet Take 1 tablet (10 mg) by mouth 2 times daily 60 tablet 1     norethindrone-ethinyl estradiol-iron (ESTROSTEP FE) 1-20/1-30/1-35 MG-MCG per tablet Take 1 tablet by mouth daily       Allergies   Allergen Reactions     Seasonal Allergies Other (See Comments)         Review of Systems:  -No arthritis or morning stiffness    -Skin: As above in HPI. No additional skin  concerns.    Physical exam:  Vitals: There were no vitals taken for this visit.  GEN: This is a well developed, well-nourished female in no acute distress, in a pleasant mood.    SKIN: Focused examination of the scalp, face and arms was performed.  -There is macular erythema of the scalp with moderate flaky white scale.  -resolving pink patch of left hand.  Scale and erythema of left conchal bowl.  No fingernail changes  -No other lesions of concern on areas examined.     Impression/Plan:  1. Psoriasis    dermasmoothe fs oil for scalp and ears.  Continue T gel shampoo    Discussed risk of psoriatic arthritis and cardiac disease        Follow-up in 3 months, earlier for new or changing lesions.       Staff Involved:  Staff Only

## 2018-06-19 NOTE — MR AVS SNAPSHOT
After Visit Summary   6/19/2018    Annette Silverman    MRN: [id number]           Patient Information     Date Of Birth          1995        Visit Information        Provider Department      6/19/2018 8:15 AM Vita Naik MD [facility]        Today's Diagnoses     Psoriasis    -  1       Follow-ups after your visit        Your next 10 appointments already scheduled     Sep 17, 2018  8:15 AM CDT   (Arrive by 8:00 AM)   Return Visit with MD KALPANA Antony [facility] ([facility] [place])    [address] [place] [id number] [phone]              Who to contact     Please call your clinic at [phone] to:    Ask questions about your health    Make or cancel appointments    Discuss your medicines    Learn about your test results    Speak to your doctor            Additional Information About Your Visit        MyChart Information     [employer] gives you secure access to your electronic health record. If you see a primary care provider, you can also send messages to your care team and make appointments. If you have questions, please call your primary care clinic.  If you do not have a primary care provider, please call [phone] and they will assist you.      [employer] is an electronic gateway that provides easy, online access to your medical records. With [employer], you can request a clinic appointment, read your test results, renew a prescription or communicate with your care team.     To access your existing account, please contact your [facility] or call [phone] for assistance.        Care EveryWhere ID     This is your Care EveryWhere ID. This could be used by other organizations to access your [place] medical records  [id number]         Blood Pressure from Last 3 Encounters:   04/23/18 137/83   02/19/18 132/81   01/30/18 143/80    Weight from Last 3 Encounters:   02/19/18  72.6 kg (160 lb)   01/30/18 75.5 kg (166 lb 6.4 oz)   01/04/18 74.4 kg (164 lb)              Today, you had the following     No orders found for display         Today's Medication Changes          These changes are accurate as of 6/19/18  8:39 AM.  If you have any questions, ask your nurse or doctor.               Start taking these medicines.        Dose/Directions    Fluocinolone Acetonide Scalp 0.01 % Oil oil   Used for:  Psoriasis   Started by:  Vita Naik MD        Use twice weekly until stable   Quantity:  120 mL   Refills:  6            Where to get your medicines      These medications were sent to [facility] [place], [place] - [address] [place] [address]     Phone:  [phone]     Fluocinolone Acetonide Scalp 0.01 % Oil oil                Primary Care Provider Fax #    Physician No Ref-Primary [phone]       No address on file        Equal Access to Services     LANDY MURCIA : Mat Corbett, waaxda luharpreet, qaybta kaalmada adenicole, sedrick stevenson . So [facility] [phone].    ATENCIÓN: Si habla español, tiene a campos disposición servicios gratuitos de asistencia lingüística. Llame al [phone].    We comply with applicable federal civil rights laws and [place] laws. We do not discriminate on the basis of race, color, national origin, age, disability, sex, sexual orientation, or gender identity.            Thank you!     Thank you for choosing [facility]ERMAT[facility]  for your care. Our goal is always to provide you with excellent care. Hearing back from our patients is one way we can continue to improve our services. Please take a few minutes to complete the written survey that you may receive in the mail after your visit with us. Thank you!             Your Updated Medication List - Protect others around you: Learn how to safely use, store and throw away your medicines at www.disposemymeds.org.           This list is accurate as of 6/19/18  8:39 AM.  Always use your most recent med list.                   Brand Name Dispense Instructions for use Diagnosis    * azelastine-fluticasone 137-50 MCG/ACT nasal spray    DYMISTA    1 Bottle    Spray 1 spray into both nostrils 2 times daily    Allergic rhinitis due to American house dust mite, Nasal congestion       * azelastine-fluticasone 137-50 MCG/ACT nasal spray    DYMISTA    1 Bottle    Spray 1 spray into both nostrils 2 times daily    Chronic seasonal allergic rhinitis due to pollen       cetirizine 10 MG tablet    ZYRTEC ALLERGY    60 tablet    Take 1 tablet (10 mg) by mouth 2 times daily    Allergic rhinitis due to American house dust mite, Nasal congestion       Fluocinolone Acetonide Scalp 0.01 % Oil oil     120 mL    Use twice weekly until stable    Psoriasis       norethindrone-ethinyl estradiol-iron 1-20/1-30/1-35 MG-MCG per tablet    ESTROSTEP FE     Take 1 tablet by mouth daily        * Notice:  This list has 2 medication(s) that are the same as other medications prescribed for you. Read the directions carefully, and ask your doctor or other care provider to review them with you.

## 2018-06-19 NOTE — LETTER
6/19/2018       RE: Annette Silverman  [address]  [place] [address]     Dear Colleague,    Thank you for referring your patient, Annette Silverman, to the [facility] DERMATOLOG[facility] at [facility]. Please see a copy of my visit note below.    [facility] Dermatology Note      Dermatology Problem List:  1.Psoriasis    CC:   Chief Complaint   Patient presents with     Derm Problem     Annette is here for concerns of psoriasis that she's had since she was 8, and currently is effecting her scalp and left ear.         Encounter Date: Jun 19, 2018    History of Present Illness:  Ms. Annette Silverman is a 23 year old female who presents for evaluation of psoriasis.  Originally diagnosed at age 8 she has been on and off topical steroids since that time.  No systemic medications.  Scalp is always involved but other body sites include face and extremities.  No facial involvement for several years.  No morning stiffness or joint pain.  She is currently using Neutragena T gel shampoo BIW and used an old tube of clobetasol on a plaque of the hand which quickly resolved.     Past Medical History:   There is no problem list on file for this patient.    Past Medical History:   Diagnosis Date     Autoimmune disease (H)     Psorasis     No past surgical history on file.    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  Originally from [place].     Family History:  Grandma psoriasis    Medications:  Current Outpatient Prescriptions   Medication Sig Dispense Refill     azelastine-fluticasone (DYMISTA) 137-50 MCG/ACT nasal spray Spray 1 spray into both nostrils 2 times daily 1 Bottle 4     azelastine-fluticasone (DYMISTA) 137-50 MCG/ACT nasal spray Spray 1 spray into both nostrils 2 times daily 1 Bottle 1     cetirizine (ZYRTEC ALLERGY) 10 MG tablet Take 1 tablet (10 mg) by mouth 2 times daily 60 tablet 1     norethindrone-ethinyl  estradiol-iron (ESTROSTEP FE) 1-20/1-30/1-35 MG-MCG per tablet Take 1 tablet by mouth daily       Allergies   Allergen Reactions     Seasonal Allergies Other (See Comments)         Review of Systems:  -No arthritis or morning stiffness    -Skin: As above in HPI. No additional skin concerns.    Physical exam:  Vitals: There were no vitals taken for this visit.  GEN: This is a well developed, well-nourished female in no acute distress, in a pleasant mood.    SKIN: Focused examination of the scalp, face and arms was performed.  -There is macular erythema of the scalp with moderate flaky white scale.  -resolving pink patch of left hand.  Scale and erythema of left conchal bowl.  No fingernail changes  -No other lesions of concern on areas examined.     Impression/Plan:  1. Psoriasis    dermasmoothe fs oil for scalp and ears.  Continue T gel shampoo    Discussed risk of psoriatic arthritis and cardiac disease        Follow-up in 3 months, earlier for new or changing lesions.       Staff Involved:  Staff Only      Again, thank you for allowing me to participate in the care of your patient.      Sincerely,    Vita Naik MD

## 2018-09-17 ENCOUNTER — OFFICE VISIT (OUTPATIENT)
Dept: DERMATOLOGY | Facility: CLINIC | Age: 23
End: 2018-09-17
Payer: COMMERCIAL

## 2018-09-17 DIAGNOSIS — L40.9 PSORIASIS: Primary | ICD-10-CM

## 2018-09-17 RX ORDER — CLOBETASOL PROPIONATE 0.5 MG/ML
SOLUTION TOPICAL 2 TIMES DAILY
Qty: 120 ML | Refills: 3 | Status: SHIPPED | OUTPATIENT
Start: 2018-09-17 | End: 2018-12-17

## 2018-09-17 ASSESSMENT — PAIN SCALES - GENERAL: PAINLEVEL: NO PAIN (0)

## 2018-09-17 NOTE — NURSING NOTE
Dermatology Rooming Note    Annette Silverman's goals for this visit include:   Chief Complaint   Patient presents with     Derm Problem     Annette is here for a psoriasis follow up, states it's about the same.          Santa Ryan LPN

## 2018-09-17 NOTE — LETTER
9/17/2018       RE: Annette Silverman  [address]  [facility] [address]     Dear Colleague,    Thank you for referring your patient, Annette Silverman, to the [facility] DERMATOLOGY at [facility]. Please see a copy of my visit note below.    [facility] Dermatology Note      Dermatology Problem List:  1.Psoriasis    CC:   Chief Complaint   Patient presents with     Derm Problem     Annette is here for a psoriasis follow up, states it's about the same.            Encounter Date: Sep 17, 2018    History of Present Illness:  Ms. Annette Silverman is a 23 year old female who presents for evaluation of psoriasis.  Originally diagnosed at age 8 she has been on and off topical steroids since that time.  No systemic medications.  Scalp is always involved but other body sites include face and extremities.  No facial involvement for several years.  No morning stiffness or joint pain. She was prescribed fluocinolone oil for scalp last visit. She has not noticed an effect or gotten relief from using it and reports it has been too oily for her hair. She also has been using topical fluocinonide solution for her skin lesions and feels it has been getting less effective.    Past Medical History:   There is no problem list on file for this patient.    Past Medical History:   Diagnosis Date     Autoimmune disease (H)     Psorasis     No past surgical history on file.    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  Originally from [place] and [place].     Family History:  Grandma: psoriasis    Medications:  Current Outpatient Prescriptions   Medication Sig Dispense Refill     azelastine-fluticasone (DYMISTA) 137-50 MCG/ACT nasal spray Spray 1 spray into both nostrils 2 times daily 1 Bottle 4     azelastine-fluticasone (DYMISTA) 137-50 MCG/ACT nasal spray Spray 1 spray into both nostrils 2 times daily 1 Bottle 1     cetirizine (ZYRTEC  ALLERGY) 10 MG tablet Take 1 tablet (10 mg) by mouth 2 times daily 60 tablet 1     clobetasol (TEMOVATE) 0.05 % external solution Apply topically 2 times daily 120 mL 3     Fluocinolone Acetonide Scalp 0.01 % OIL oil Use twice weekly until stable 120 mL 6     fluocinonide (LIDEX) 0.05 % solution Apply topically 2 times daily 60 mL 3     norethindrone-ethinyl estradiol-iron (ESTROSTEP FE) 1-20/1-30/1-35 MG-MCG per tablet Take 1 tablet by mouth daily       Allergies   Allergen Reactions     Seasonal Allergies Other (See Comments)         Review of Systems:  -No arthritis or morning stiffness    -Skin: As above in HPI. No additional skin concerns.    Physical exam:  Vitals: There were no vitals taken for this visit.  GEN: This is a well developed, well-nourished female in no acute distress, in a pleasant mood.    SKIN: Focused examination of the scalp, face and arms was performed.  -There is macular erythema of the scalp with moderate flaky white scale.  -Erythematous plaque with flaky scale in L ear  -Scaly area right elbow  -No other lesions of concern on areas examined.     Impression/Plan:  1. Psoriasis    Clobetasol solution for scalp.    dermasmoothe fs oil for ears.     Continue T gel shampoo    Follow-up in 3 months, earlier for new or changing lesions.     Dez Wills MD, MPH  Internal Medicine, PGY-3    Staff Involved: Dr Naik  .I, Vita Naik MD, saw this patient with the resident and agree with the resident s findings and plan of care as documented in the resident s note.      Vita Naik MD

## 2018-09-17 NOTE — PROGRESS NOTES
[facility] Dermatology Note      Dermatology Problem List:  1.Psoriasis    CC:   Chief Complaint   Patient presents with     Derm Problem     Annette is here for a psoriasis follow up, states it's about the same.            Encounter Date: Sep 17, 2018    History of Present Illness:  Ms. Annette Silverman is a 23 year old female who presents for evaluation of psoriasis.  Originally diagnosed at age 8 she has been on and off topical steroids since that time.  No systemic medications.  Scalp is always involved but other body sites include face and extremities.  No facial involvement for several years.  No morning stiffness or joint pain. She was prescribed fluocinolone oil for scalp last visit. She has not noticed an effect or gotten relief from using it and reports it has been too oily for her hair. She also has been using topical fluocinonide solution for her skin lesions and feels it has been getting less effective.    Past Medical History:   There is no problem list on file for this patient.    Past Medical History:   Diagnosis Date     Autoimmune disease (H)     Psorasis     No past surgical history on file.    Social History:  The patient is now a grad student at the [employer].  Originally from [place].     Family History:  Grandma: psoriasis    Medications:  Current Outpatient Prescriptions   Medication Sig Dispense Refill     azelastine-fluticasone (DYMISTA) 137-50 MCG/ACT nasal spray Spray 1 spray into both nostrils 2 times daily 1 Bottle 4     azelastine-fluticasone (DYMISTA) 137-50 MCG/ACT nasal spray Spray 1 spray into both nostrils 2 times daily 1 Bottle 1     cetirizine (ZYRTEC ALLERGY) 10 MG tablet Take 1 tablet (10 mg) by mouth 2 times daily 60 tablet 1     clobetasol (TEMOVATE) 0.05 % external solution Apply topically 2 times daily 120 mL 3     Fluocinolone Acetonide Scalp 0.01 % OIL oil Use twice weekly until stable 120 mL 6     fluocinonide (LIDEX) 0.05 %  solution Apply topically 2 times daily 60 mL 3     norethindrone-ethinyl estradiol-iron (ESTROSTEP FE) 1-20/1-30/1-35 MG-MCG per tablet Take 1 tablet by mouth daily       Allergies   Allergen Reactions     Seasonal Allergies Other (See Comments)         Review of Systems:  -No arthritis or morning stiffness    -Skin: As above in HPI. No additional skin concerns.    Physical exam:  Vitals: There were no vitals taken for this visit.  GEN: This is a well developed, well-nourished female in no acute distress, in a pleasant mood.    SKIN: Focused examination of the scalp, face and arms was performed.  -There is macular erythema of the scalp with moderate flaky white scale.  -Erythematous plaque with flaky scale in L ear  -Scaly area right elbow  -No other lesions of concern on areas examined.     Impression/Plan:  1. Psoriasis    Clobetasol solution for scalp.    dermasmoothe fs oil for ears.     Continue T gel shampoo    Follow-up in 3 months, earlier for new or changing lesions.     Dez Wills MD, MPH  Internal Medicine, PGY-3    Staff Involved: Dr Naik  .I, Vita Naik MD, saw this patient with the resident and agree with the resident s findings and plan of care as documented in the resident s note.

## 2018-09-17 NOTE — MR AVS SNAPSHOT
After Visit Summary   9/17/2018    Annette Silverman    MRN: [id number]           Patient Information     Date Of Birth          1995        Visit Information        Provider Department      9/17/2018 8:15 AM Vita Naik MD [facility] Dermatology        Today's Diagnoses     Psoriasis    -  1       Follow-ups after your visit        Your next 10 appointments already scheduled     Dec 17, 2018  8:00 AM CST   (Arrive by 7:45 AM)   Return Visit with MD KALPANA Antony [facility] Dermatology ([facility])    [address] [place] [id number] [phone]              Who to contact     Please call your clinic at [phone] to:    Ask questions about your health    Make or cancel appointments    Discuss your medicines    Learn about your test results    Speak to your doctor            Additional Information About Your Visit        MyChart Information     [employer] gives you secure access to your electronic health record. If you see a primary care provider, you can also send messages to your care team and make appointments. If you have questions, please call your primary care clinic.  If you do not have a primary care provider, please call [phone] and they will assist you.      [employer] is an electronic gateway that provides easy, online access to your medical records. With [employer], you can request a clinic appointment, read your test results, renew a prescription or communicate with your care team.     To access your existing account, please contact your [facility] or call [phone] for assistance.        Care EveryWhere ID     This is your Care EveryWhere ID. This could be used by other organizations to access your [place] medical records  [id number]         Blood Pressure from Last 3 Encounters:   04/23/18 137/83   02/19/18 132/81   01/30/18 143/80    Weight from Last 3 Encounters:   02/19/18  72.6 kg (160 lb)   01/30/18 75.5 kg (166 lb 6.4 oz)   01/04/18 74.4 kg (164 lb)              Today, you had the following     No orders found for display         Today's Medication Changes          These changes are accurate as of 9/17/18 12:56 PM.  If you have any questions, ask your nurse or doctor.               Start taking these medicines.        Dose/Directions    clobetasol 0.05 % external solution   Commonly known as:  TEMOVATE   Used for:  Psoriasis   Started by:  Vita Naik MD        Apply topically 2 times daily   Quantity:  120 mL   Refills:  3            Where to get your medicines      These medications were sent to [address] [address]     Phone:  [phone]     clobetasol 0.05 % external solution                Primary Care Provider Fax #    Physician No Ref-Primary [phone]       No address on file        Equal Access to Services     LANDY MURCIA AH: Hadii aad ku hadasho Soomaali, waaxda luqadaha, qaybta kaalmada adeegyada, sedrick stevenson . So [facility] [phone].    ATENCIÓN: Si habla español, tiene a campos disposición servicios gratuitos de asistencia lingüística. Llame al [phone].    We comply with applicable federal civil rights laws and [place] laws. We do not discriminate on the basis of race, color, national origin, age, disability, sex, sexual orientation, or gender identity.            Thank you!     Thank you for choosing [facility] DERMATOLOGY  for your care. Our goal is always to provide you with excellent care. Hearing back from our patients is one way we can continue to improve our services. Please take a few minutes to complete the written survey that you may receive in the mail after your visit with us. Thank you!             Your Updated Medication List - Protect others around you: Learn how to safely use, store and throw away your medicines at  www.disposemymeds.org.          This list is accurate as of 9/17/18 12:56 PM.  Always use your most recent med list.                   Brand Name Dispense Instructions for use Diagnosis    * azelastine-fluticasone 137-50 MCG/ACT nasal spray    DYMISTA    1 Bottle    Spray 1 spray into both nostrils 2 times daily    Allergic rhinitis due to American house dust mite, Nasal congestion       * azelastine-fluticasone 137-50 MCG/ACT nasal spray    DYMISTA    1 Bottle    Spray 1 spray into both nostrils 2 times daily    Chronic seasonal allergic rhinitis due to pollen       cetirizine 10 MG tablet    ZYRTEC ALLERGY    60 tablet    Take 1 tablet (10 mg) by mouth 2 times daily    Allergic rhinitis due to [employer] house dust mite, Nasal congestion       clobetasol 0.05 % external solution    TEMOVATE    120 mL    Apply topically 2 times daily    Psoriasis       Fluocinolone Acetonide Scalp 0.01 % Oil oil     120 mL    Use twice weekly until stable    Psoriasis       fluocinonide 0.05 % solution    LIDEX    60 mL    Apply topically 2 times daily    Psoriasis       norethindrone-ethinyl estradiol-iron 1-20/1-30/1-35 MG-MCG per tablet    ESTROSTEP FE     Take 1 tablet by mouth daily        * Notice:  This list has 2 medication(s) that are the same as other medications prescribed for you. Read the directions carefully, and ask your doctor or other care provider to review them with you.

## 2018-12-17 ENCOUNTER — OFFICE VISIT (OUTPATIENT)
Dept: DERMATOLOGY | Facility: CLINIC | Age: 23
End: 2018-12-17
Payer: COMMERCIAL

## 2018-12-17 DIAGNOSIS — L40.9 PSORIASIS: ICD-10-CM

## 2018-12-17 RX ORDER — CLOBETASOL PROPIONATE 0.5 MG/ML
SOLUTION TOPICAL 2 TIMES DAILY
Qty: 120 ML | Refills: 6 | Status: SHIPPED | OUTPATIENT
Start: 2018-12-17 | End: 2020-10-07

## 2018-12-17 ASSESSMENT — PAIN SCALES - GENERAL: PAINLEVEL: NO PAIN (0)

## 2018-12-17 NOTE — NURSING NOTE
Dermatology Rooming Note    Annette Silverman's goals for this visit include:   Chief Complaint   Patient presents with     Derm Problem     Annette is here to follow up on psoriasis.     Sadaf Vaughan, CMA

## 2018-12-17 NOTE — LETTER
12/17/2018       RE: Annette Silverman  [address]  [facility] [address]     Dear Colleague,    Thank you for referring your patient, Annette Silverman, to the [facility] DERMATOLOGY at [facility]. Please see a copy of my visit note below.    [facility] Dermatology Note      Dermatology Problem List:  1.Psoriasis    CC:   Chief Complaint   Patient presents with     Derm Problem     Annette is here to follow up on psoriasis.         Encounter Date: Dec 17, 2018    History of Present Illness:  Ms. Annette Silverman is a 23 year old female who returns for scalp/ear psoriasis.  She is responding very well to the clobetsol solution which she needs to use 2 to 3 times per week.  She is not using the Dermasmoothe fs oil.  She is uisng a coconut shampoo which helps with the flaking.  She is pleased with her treatment.    Past Medical History:   There is no problem list on file for this patient.    Past Medical History:   Diagnosis Date     Autoimmune disease (H)     Psorasis     No past surgical history on file.    Social History:  Patient reports that  has never smoked. she has never used smokeless tobacco. She reports that she does not drink alcohol or use drugs.    Family History:  Family History   Problem Relation Age of Onset     Stomach Problem Mother      Skin Cancer No family hx of      Melanoma No family hx of        Medications:  Current Outpatient Medications   Medication Sig Dispense Refill     cetirizine (ZYRTEC ALLERGY) 10 MG tablet Take 1 tablet (10 mg) by mouth 2 times daily 60 tablet 1     clobetasol (TEMOVATE) 0.05 % external solution Apply topically 2 times daily 120 mL 6     Fluocinolone Acetonide Scalp 0.01 % OIL oil Use twice weekly until stable 120 mL 6     fluocinonide (LIDEX) 0.05 % solution Apply topically 2 times daily 60 mL 3     norethindrone-ethinyl estradiol-iron (ESTROSTEP FE) 1-20/1-30/1-35 MG-MCG per tablet Take 1 tablet by  mouth daily       azelastine-fluticasone (DYMISTA) 137-50 MCG/ACT nasal spray Spray 1 spray into both nostrils 2 times daily (Patient not taking: Reported on 12/17/2018) 1 Bottle 4     azelastine-fluticasone (DYMISTA) 137-50 MCG/ACT nasal spray Spray 1 spray into both nostrils 2 times daily (Patient not taking: Reported on 12/17/2018) 1 Bottle 1     Allergies   Allergen Reactions     Seasonal Allergies Other (See Comments)     Physical exam:  Vitals: There were no vitals taken for this visit.  GEN: This is a well developed, well-nourished female in no acute distress, in a pleasant mood.    SKIN: Focused examination of the scalp and face was performed.  -There is macular erythema of the occiput with mild flaky white scale.  -No other lesions of concern on areas examined.     Impression/Plan:  1. Psoriasis    Improved and controlled    Continue clobetasol solution as needed. Patient using appropriately without signs of atrophy.  Needs to use 2 to 3 times per week.        CC Md [facility], MD  [address] [place], [place] [id number] on close of this encounter.  Follow-up in 1 year, earlier for new or changing lesions.       Staff Involved:  Staff Only    Again, thank you for allowing me to participate in the care of your patient.      Sincerely,    Vita Naik MD

## 2018-12-17 NOTE — PROGRESS NOTES
[facility] Dermatology Note      Dermatology Problem List:  1.Psoriasis    CC:   Chief Complaint   Patient presents with     Derm Problem     Annette is here to follow up on psoriasis.         Encounter Date: Dec 17, 2018    History of Present Illness:  Ms. Annette Silverman is a 23 year old female who returns for scalp/ear psoriasis.  She is responding very well to the clobetsol solution which she needs to use 2 to 3 times per week.  She is not using the Dermasmoothe fs oil.  She is uisng a coconut shampoo which helps with the flaking.  She is pleased with her treatment.    Past Medical History:   There is no problem list on file for this patient.    Past Medical History:   Diagnosis Date     Autoimmune disease (H)     Psorasis     No past surgical history on file.    Social History:  Patient reports that  has never smoked. she has never used smokeless tobacco. She reports that she does not drink alcohol or use drugs.    Family History:  Family History   Problem Relation Age of Onset     Stomach Problem Mother      Skin Cancer No family hx of      Melanoma No family hx of        Medications:  Current Outpatient Medications   Medication Sig Dispense Refill     cetirizine (ZYRTEC ALLERGY) 10 MG tablet Take 1 tablet (10 mg) by mouth 2 times daily 60 tablet 1     clobetasol (TEMOVATE) 0.05 % external solution Apply topically 2 times daily 120 mL 6     Fluocinolone Acetonide Scalp 0.01 % OIL oil Use twice weekly until stable 120 mL 6     fluocinonide (LIDEX) 0.05 % solution Apply topically 2 times daily 60 mL 3     norethindrone-ethinyl estradiol-iron (ESTROSTEP FE) 1-20/1-30/1-35 MG-MCG per tablet Take 1 tablet by mouth daily       azelastine-fluticasone (DYMISTA) 137-50 MCG/ACT nasal spray Spray 1 spray into both nostrils 2 times daily (Patient not taking: Reported on 12/17/2018) 1 Bottle 4     azelastine-fluticasone (DYMISTA) 137-50 MCG/ACT nasal spray Spray 1 spray into both nostrils 2 times daily  (Patient not taking: Reported on 12/17/2018) 1 Bottle 1     Allergies   Allergen Reactions     Seasonal Allergies Other (See Comments)           Physical exam:  Vitals: There were no vitals taken for this visit.  GEN: This is a well developed, well-nourished female in no acute distress, in a pleasant mood.    SKIN: Focused examination of the scalp and face was performed.  -There is macular erythema of the occiput with mild flaky white scale.  -No other lesions of concern on areas examined.     Impression/Plan:  1. Psoriasis    Improved and controlled    Continue clobetasol solution as needed. Patient using appropriately without signs of atrophy.  Needs to use 2 to 3 times per week.        CC Md [facility], MD  [address] [place] [address] on close of this encounter.  Follow-up in 1 year, earlier for new or changing lesions.       Staff Involved:  Staff Only

## 2020-03-11 ENCOUNTER — HEALTH MAINTENANCE LETTER (OUTPATIENT)
Age: 25
End: 2020-03-11

## 2020-05-18 DIAGNOSIS — L40.9 PSORIASIS: ICD-10-CM

## 2020-05-20 RX ORDER — CLOBETASOL PROPIONATE 0.5 MG/ML
SOLUTION TOPICAL 2 TIMES DAILY
Qty: 120 ML | Refills: 6 | OUTPATIENT
Start: 2020-05-20

## 2020-10-07 ENCOUNTER — OFFICE VISIT (OUTPATIENT)
Dept: DERMATOLOGY | Facility: CLINIC | Age: 25
End: 2020-10-07
Payer: COMMERCIAL

## 2020-10-07 DIAGNOSIS — L40.9 PSORIASIS: ICD-10-CM

## 2020-10-07 PROCEDURE — 99213 OFFICE O/P EST LOW 20 MIN: CPT | Performed by: DERMATOLOGY

## 2020-10-07 RX ORDER — FLUOCINOLONE ACETONIDE 0.11 MG/ML
OIL TOPICAL
Qty: 120 ML | Refills: 11 | Status: SHIPPED | OUTPATIENT
Start: 2020-10-07

## 2020-10-07 RX ORDER — HYDROCORTISONE 25 MG/G
OINTMENT TOPICAL
Qty: 60 G | Refills: 11 | Status: SHIPPED | OUTPATIENT
Start: 2020-10-07

## 2020-10-07 RX ORDER — CLOBETASOL PROPIONATE 0.5 MG/ML
SOLUTION TOPICAL 2 TIMES DAILY
Qty: 120 ML | Refills: 6 | Status: SHIPPED | OUTPATIENT
Start: 2020-10-07

## 2020-10-07 ASSESSMENT — PAIN SCALES - GENERAL: PAINLEVEL: NO PAIN (0)

## 2020-10-07 NOTE — LETTER
10/7/2020       RE: Annette Silverman  [address]  [facility] [place] [address]     Dear Colleague,    Thank you for referring your patient, Annette Silverman, to the [facility] DERMATOLOGY CLINIC [place] at [facility]. Please see a copy of my visit note below.    [facility] Dermatology Note      Dermatology Problem List:  1. Psoriasis of the scalp and left ear canal  -Current tx: clobetasol 0.05% solution, fluocinolone acetonide 0.01% scalp oil  2. Inverse psoriasis of genital area  -Current tx: hydrocortisone 2.5% ointment    Encounter Date: Oct 7, 2020    CC:  Chief Complaint   Patient presents with     Derm Problem     Annette is here for concerns of psoriasis on her scalp, ear, and groin.  States topical treatments didn't help much and wants to discuss different treatment options.      History of Present Illness:  Ms. Annette Silverman is a 25 year old female who presents for follow up of psoriasis. Last seen 09/17/2018. She has been using clobetasol solution on her scalp, but ran out 6 months ago. Given the clobetasol solution was not controlling her symptoms of itching and flaking, she tried OTC creams and herbal remedies without improvement. Today she has itching and flaking of the scalp, left ear, vaginal and perianal area. Otherwise feeling well with no other concerns.     Past Medical History:   There is no problem list on file for this patient.    Past Medical History:   Diagnosis Date     Autoimmune disease (H)     Psorasis     No past surgical history on file.    Social History:  Patient reports that she has never smoked. She has never used smokeless tobacco. She reports that she does not drink alcohol or use drugs.    Family History:  Family History   Problem Relation Age of Onset     Stomach Problem Mother      Skin Cancer No family hx of      Melanoma No family hx of      Medications:  Current Outpatient Medications   Medication Sig  Dispense Refill     cetirizine (ZYRTEC ALLERGY) 10 MG tablet Take 1 tablet (10 mg) by mouth 2 times daily 60 tablet 1     norethindrone-ethinyl estradiol-iron (ESTROSTEP FE) 1-20/1-30/1-35 MG-MCG per tablet Take 1 tablet by mouth daily       azelastine-fluticasone (DYMISTA) 137-50 MCG/ACT nasal spray Spray 1 spray into both nostrils 2 times daily (Patient not taking: Reported on 12/17/2018) 1 Bottle 4     azelastine-fluticasone (DYMISTA) 137-50 MCG/ACT nasal spray Spray 1 spray into both nostrils 2 times daily (Patient not taking: Reported on 12/17/2018) 1 Bottle 1     clobetasol (TEMOVATE) 0.05 % external solution Apply topically 2 times daily (Patient not taking: Reported on 10/7/2020) 120 mL 6     Fluocinolone Acetonide Scalp 0.01 % OIL oil Use twice weekly until stable (Patient not taking: Reported on 10/7/2020) 120 mL 6     fluocinonide (LIDEX) 0.05 % solution Apply topically 2 times daily (Patient not taking: Reported on 10/7/2020) 60 mL 3     Allergies   Allergen Reactions     Seasonal Allergies Other (See Comments)     Review of Systems:  -Constitutional: Otherwise feeling well today, in usual state of health.  -Skin: As above in HPI. No additional skin concerns.    Physical exam:  Vitals: There were no vitals taken for this visit.  GEN: This is a well developed, well-nourished female in no acute distress, in a pleasant mood.    SKIN: Focused examination of the scalp, ears bilaterally, and genital area was performed.  -Judd skin type: II  -Diffuse erythematous plaques with white flakes over the parietal and occipital scalp, and the left ear canal.   -Similar erythematous plaques on the labia major bilaterally.   -No other lesions of concern on areas examined.     Impression/Plan:  1. Psoriasis of the scalp and L ear canal   - Continue cortisol 0.05% solution   - Start fluocinolone acetonide 0.01% scalp oil daily. Once under control, may use less frequently (weekly or every other week) for maintenance.    - Discussed if psoriasis of the scalp was less diffuse, we could consider ILK.     2. Inverse psoriasis of the gential area  - Start applying hydrocortisone 2.5% ointment daily to the groin area.   - Discussed if symptoms do not improve, will consider systemic treatment such as methotrexate.     CC Referred Self, MD  No address on file on close of this encounter.    Follow-up in 1 month for psoriasis, earlier for new or changing lesions.     Staff Involved:  I, Leonie Veras MS4, saw and examined the patient in the presence of Dr. Naik.    I was present with the medical student who participated in the service and in the documentation of the note. I have verified the history and personally performed the physical exam and medical decision making. I agree with the assessment and plan of care as documented in the note.  Vita Naik MD

## 2020-10-07 NOTE — PROGRESS NOTES
[facility] Dermatology Note      Dermatology Problem List:  1. Psoriasis of the scalp and left ear canal  -Current tx: clobetasol 0.05% solution, fluocinolone acetonide 0.01% scalp oil  2. Inverse psoriasis of genital area  -Current tx: hydrocortisone 2.5% ointment    Encounter Date: Oct 7, 2020    CC:  Chief Complaint   Patient presents with     Derm Problem     Annette is here for concerns of psoriasis on her scalp, ear, and groin.  States topical treatments didn't help much and wants to discuss different treatment options.      History of Present Illness:  Ms. Annette Silverman is a 25 year old female who presents for follow up of psoriasis. Last seen 09/17/2018. She has been using clobetasol solution on her scalp, but ran out 6 months ago. Given the clobetasol solution was not controlling her symptoms of itching and flaking, she tried OTC creams and herbal remedies without improvement. Today she has itching and flaking of the scalp, left ear, vaginal and perianal area. Otherwise feeling well with no other concerns.     Past Medical History:   There is no problem list on file for this patient.    Past Medical History:   Diagnosis Date     Autoimmune disease (H)     Psorasis     No past surgical history on file.    Social History:  Patient reports that she has never smoked. She has never used smokeless tobacco. She reports that she does not drink alcohol or use drugs.    Family History:  Family History   Problem Relation Age of Onset     Stomach Problem Mother      Skin Cancer No family hx of      Melanoma No family hx of      Medications:  Current Outpatient Medications   Medication Sig Dispense Refill     cetirizine (ZYRTEC ALLERGY) 10 MG tablet Take 1 tablet (10 mg) by mouth 2 times daily 60 tablet 1     norethindrone-ethinyl estradiol-iron (ESTROSTEP FE) 1-20/1-30/1-35 MG-MCG per tablet Take 1 tablet by mouth daily       azelastine-fluticasone (DYMISTA) 137-50 MCG/ACT nasal spray Spray 1  spray into both nostrils 2 times daily (Patient not taking: Reported on 12/17/2018) 1 Bottle 4     azelastine-fluticasone (DYMISTA) 137-50 MCG/ACT nasal spray Spray 1 spray into both nostrils 2 times daily (Patient not taking: Reported on 12/17/2018) 1 Bottle 1     clobetasol (TEMOVATE) 0.05 % external solution Apply topically 2 times daily (Patient not taking: Reported on 10/7/2020) 120 mL 6     Fluocinolone Acetonide Scalp 0.01 % OIL oil Use twice weekly until stable (Patient not taking: Reported on 10/7/2020) 120 mL 6     fluocinonide (LIDEX) 0.05 % solution Apply topically 2 times daily (Patient not taking: Reported on 10/7/2020) 60 mL 3     Allergies   Allergen Reactions     Seasonal Allergies Other (See Comments)     Review of Systems:  -Constitutional: Otherwise feeling well today, in usual state of health.  -Skin: As above in HPI. No additional skin concerns.    Physical exam:  Vitals: There were no vitals taken for this visit.  GEN: This is a well developed, well-nourished female in no acute distress, in a pleasant mood.    SKIN: Focused examination of the scalp, ears bilaterally, and genital area was performed.  -Judd skin type: II  -Diffuse erythematous plaques with white flakes over the parietal and occipital scalp, and the left ear canal.   -Similar erythematous plaques on the labia major bilaterally.   -No other lesions of concern on areas examined.     Impression/Plan:  1. Psoriasis of the scalp and L ear canal   - Continue cortisol 0.05% solution   - Start fluocinolone acetonide 0.01% scalp oil daily. Once under control, may use less frequently (weekly or every other week) for maintenance.   - Discussed if psoriasis of the scalp was less diffuse, we could consider ILK.     2. Inverse psoriasis of the gential area  - Start applying hydrocortisone 2.5% ointment daily to the groin area.   - Discussed if symptoms do not improve, will consider systemic treatment such as methotrexate.     CC  Referred Self, MD  No address on file on close of this encounter.    Follow-up in 1 month for psoriasis, earlier for new or changing lesions.     Staff Involved:  I, Leonie Veras MS4, saw and examined the patient in the presence of Dr. Naik.    I was present with the medical student who participated in the service and in the documentation of the note. I have verified the history and personally performed the physical exam and medical decision making. I agree with the assessment and plan of care as documented in the note.  Vita Naik MD

## 2020-10-07 NOTE — NURSING NOTE
Dermatology Rooming Note    Annette Silverman's goals for this visit include:   Chief Complaint   Patient presents with     Derm Problem     Annette is here for concerns of psoriasis on her scalp, ear, and groin.  States topical treatments didn't help much and wants to discuss different treatment options.        [place], AKBAR

## 2020-12-27 ENCOUNTER — HEALTH MAINTENANCE LETTER (OUTPATIENT)
Age: 25
End: 2020-12-27

## 2021-03-30 ENCOUNTER — TELEPHONE (OUTPATIENT)
Dept: DERMATOLOGY | Facility: CLINIC | Age: 26
End: 2021-03-30

## 2021-03-30 ENCOUNTER — OFFICE VISIT (OUTPATIENT)
Dept: DERMATOLOGY | Facility: CLINIC | Age: 26
End: 2021-03-30
Payer: COMMERCIAL

## 2021-03-30 ENCOUNTER — ANCILLARY PROCEDURE (OUTPATIENT)
Dept: GENERAL RADIOLOGY | Facility: CLINIC | Age: 26
End: 2021-03-30
Attending: DERMATOLOGY
Payer: COMMERCIAL

## 2021-03-30 DIAGNOSIS — L40.8 INVERSE PSORIASIS: ICD-10-CM

## 2021-03-30 DIAGNOSIS — L40.8 INVERSE PSORIASIS: Primary | ICD-10-CM

## 2021-03-30 LAB
ALBUMIN SERPL-MCNC: 4.2 G/DL (ref 3.4–5)
ALP SERPL-CCNC: 42 U/L (ref 40–150)
ALT SERPL W P-5'-P-CCNC: 30 U/L (ref 0–50)
ANION GAP SERPL CALCULATED.3IONS-SCNC: 6 MMOL/L (ref 3–14)
AST SERPL W P-5'-P-CCNC: 12 U/L (ref 0–45)
B-HCG SERPL-ACNC: <1 IU/L (ref 0–5)
BASOPHILS # BLD AUTO: 0 10E9/L (ref 0–0.2)
BASOPHILS NFR BLD AUTO: 0.2 %
BILIRUB SERPL-MCNC: 0.5 MG/DL (ref 0.2–1.3)
BUN SERPL-MCNC: 8 MG/DL (ref 7–30)
CALCIUM SERPL-MCNC: 9.3 MG/DL (ref 8.5–10.1)
CHLORIDE SERPL-SCNC: 110 MMOL/L (ref 94–109)
CO2 SERPL-SCNC: 25 MMOL/L (ref 20–32)
CREAT SERPL-MCNC: 0.82 MG/DL (ref 0.52–1.04)
DIFFERENTIAL METHOD BLD: NORMAL
EOSINOPHIL # BLD AUTO: 0.1 10E9/L (ref 0–0.7)
EOSINOPHIL NFR BLD AUTO: 1.3 %
ERYTHROCYTE [DISTWIDTH] IN BLOOD BY AUTOMATED COUNT: 11.6 % (ref 10–15)
GFR SERPL CREATININE-BSD FRML MDRD: >90 ML/MIN/{1.73_M2}
GLUCOSE SERPL-MCNC: 106 MG/DL (ref 70–99)
HBV CORE AB SERPL QL IA: NONREACTIVE
HBV SURFACE AB SERPL IA-ACNC: 0.02 M[IU]/ML
HBV SURFACE AG SERPL QL IA: NONREACTIVE
HCT VFR BLD AUTO: 40.7 % (ref 35–47)
HCV AB SERPL QL IA: NONREACTIVE
HGB BLD-MCNC: 13.8 G/DL (ref 11.7–15.7)
IMM GRANULOCYTES # BLD: 0 10E9/L (ref 0–0.4)
IMM GRANULOCYTES NFR BLD: 0.4 %
LYMPHOCYTES # BLD AUTO: 1.7 10E9/L (ref 0.8–5.3)
LYMPHOCYTES NFR BLD AUTO: 29.6 %
MCH RBC QN AUTO: 31.3 PG (ref 26.5–33)
MCHC RBC AUTO-ENTMCNC: 33.9 G/DL (ref 31.5–36.5)
MCV RBC AUTO: 92 FL (ref 78–100)
MONOCYTES # BLD AUTO: 0.3 10E9/L (ref 0–1.3)
MONOCYTES NFR BLD AUTO: 5.9 %
NEUTROPHILS # BLD AUTO: 3.5 10E9/L (ref 1.6–8.3)
NEUTROPHILS NFR BLD AUTO: 62.6 %
NRBC # BLD AUTO: 0 10*3/UL
NRBC BLD AUTO-RTO: 0 /100
PLATELET # BLD AUTO: 194 10E9/L (ref 150–450)
POTASSIUM SERPL-SCNC: 3.6 MMOL/L (ref 3.4–5.3)
PROT SERPL-MCNC: 7.7 G/DL (ref 6.8–8.8)
RBC # BLD AUTO: 4.41 10E12/L (ref 3.8–5.2)
SODIUM SERPL-SCNC: 141 MMOL/L (ref 133–144)
WBC # BLD AUTO: 5.6 10E9/L (ref 4–11)

## 2021-03-30 PROCEDURE — 71046 X-RAY EXAM CHEST 2 VIEWS: CPT | Mod: GC | Performed by: RADIOLOGY

## 2021-03-30 PROCEDURE — 84702 CHORIONIC GONADOTROPIN TEST: CPT | Performed by: PATHOLOGY

## 2021-03-30 PROCEDURE — 85025 COMPLETE CBC W/AUTO DIFF WBC: CPT | Performed by: PATHOLOGY

## 2021-03-30 PROCEDURE — 86481 TB AG RESPONSE T-CELL SUSP: CPT | Mod: 90 | Performed by: PATHOLOGY

## 2021-03-30 PROCEDURE — 86704 HEP B CORE ANTIBODY TOTAL: CPT | Mod: 90 | Performed by: PATHOLOGY

## 2021-03-30 PROCEDURE — 87340 HEPATITIS B SURFACE AG IA: CPT | Mod: 90 | Performed by: PATHOLOGY

## 2021-03-30 PROCEDURE — 36415 COLL VENOUS BLD VENIPUNCTURE: CPT | Performed by: PATHOLOGY

## 2021-03-30 PROCEDURE — 86803 HEPATITIS C AB TEST: CPT | Mod: 90 | Performed by: PATHOLOGY

## 2021-03-30 PROCEDURE — 86706 HEP B SURFACE ANTIBODY: CPT | Mod: 90 | Performed by: PATHOLOGY

## 2021-03-30 PROCEDURE — 99214 OFFICE O/P EST MOD 30 MIN: CPT | Mod: GC | Performed by: DERMATOLOGY

## 2021-03-30 PROCEDURE — 99000 SPECIMEN HANDLING OFFICE-LAB: CPT | Performed by: PATHOLOGY

## 2021-03-30 PROCEDURE — 80053 COMPREHEN METABOLIC PANEL: CPT | Performed by: PATHOLOGY

## 2021-03-30 NOTE — PROGRESS NOTES
[facility] Dermatology Note      Dermatology Problem List:  1. Psoriasis of the scalp and left ear canal  -Current tx: clobetasol 0.05% solution, fluocinolone acetonide 0.01% scalp oil  2. Inverse psoriasis of genital area  -Current tx: Starting Humira (3/30/21), hydrocortisone 2.5% ointment    Encounter Date: Mar 30, 2021    CC:  Chief Complaint   Patient presents with     Derm Problem     Psoriasis f/u      History of Present Illness:  Ms. Annette Silverman is a 26 year old female who presents for follow up of psoriasis. Using clobetasol for scalp, hydrocortisone cream for ear and genital area. Things have not changed much since her last visit and she feels very bothered by the current symptoms. The groin area is bothersome especially with urination and sexual activity. She feels like it has gotten to the point where her skin disease does interfere with her quality of life with burning and pain with the above activities.    Past Medical History:   There is no problem list on file for this patient.    Past Medical History:   Diagnosis Date     Autoimmune disease (H)     Psorasis     No past surgical history on file.    Social History:  Patient reports that she has never smoked. She has never used smokeless tobacco. She reports that she does not drink alcohol or use drugs.    Family History:  Family History   Problem Relation Age of Onset     Stomach Problem Mother      Skin Cancer No family hx of      Melanoma No family hx of      Medications:  Current Outpatient Medications   Medication Sig Dispense Refill     cetirizine (ZYRTEC ALLERGY) 10 MG tablet Take 1 tablet (10 mg) by mouth 2 times daily 60 tablet 1     clobetasol (TEMOVATE) 0.05 % external solution Apply topically 2 times daily 120 mL 6     Fluocinolone Acetonide Scalp (DERMA-SMOOTHE/FS SCALP) 0.01 % OIL oil Use daily with shower cap 120 mL 11     hydrocortisone 2.5 % ointment Use daily to groin rash 60 g 11     Multiple Vitamin  (MULTIVITAMIN ADULT PO)        norethindrone-ethinyl estradiol-iron (ESTROSTEP FE) 1-20/1-30/1-35 MG-MCG per tablet Take 1 tablet by mouth daily       azelastine-fluticasone (DYMISTA) 137-50 MCG/ACT nasal spray Spray 1 spray into both nostrils 2 times daily (Patient not taking: Reported on 12/17/2018) 1 Bottle 4     azelastine-fluticasone (DYMISTA) 137-50 MCG/ACT nasal spray Spray 1 spray into both nostrils 2 times daily (Patient not taking: Reported on 12/17/2018) 1 Bottle 1     Fluocinolone Acetonide Scalp 0.01 % OIL oil Use twice weekly until stable (Patient not taking: Reported on 10/7/2020) 120 mL 6     fluocinonide (LIDEX) 0.05 % solution Apply topically 2 times daily (Patient not taking: Reported on 10/7/2020) 60 mL 3     Allergies   Allergen Reactions     Seasonal Allergies Other (See Comments)     Review of Systems:  -Constitutional: Otherwise feeling well today, in usual state of health.  -Skin: As above in HPI. No additional skin concerns.    Physical exam:  Vitals: There were no vitals taken for this visit.  GEN: This is a well developed, well-nourished female in no acute distress, in a pleasant mood.    SKIN: Focused examination of the scalp, ears bilaterally, and genital area was performed.  -Diffuse erythematous plaques with white flakes over the parietal and occipital scalp, and the left ear canal.   -Erythematous plaques on the labia major bilaterally.   -No other lesions of concern on areas examined.     Impression/Plan:  1. Psoriasis of the scalp and L ear canal   - Continue clobetasol 0.05% solution, fluocinolone acetonide 0.01% scalp oil daily to scalp as needed  - Continue hydrocortisone cream to L ear canal as needed    2. Inverse psoriasis of the gential area  The psoriasis has not been responsive to topical medications despite consistent use of topical medications. Given the extensive genital involvement, the patient is experiencing extreme discomfort with urination and sexual activity.  Psoriasis is a major contributor to skin associated quality of life impact and studies have shown that inverse psoriasis/genital involvement can have a a very significant negative impact on quality of life. Discussed that since topical therapies have not been effective, we would recommend considering TNF alpha inhibitors like Humira given the significant quality of life impact.  - Obtained labs for Humira today (CBC, CMP, Hep B, Hep C, Quant gold, pregnancy test)  - Will also chest CXR given that Histoplasmosis is endemic to this area  - Ordered Humira, awaiting prior authorization  - Recommend continuing the topicals in the interim, while awaiting prior authorization  - Discussed that these medications are used typically to treat psoriasis and target one area of the immune system to bring it into a more normal range. Side effects include: injection site reactions, reactivation of some infections (so we check some labs yearly to make sure that this does not happen and get an xray before this start).  Advised patient to hold the injection if she experiences fever, chills, need to be on antibiotics, etc    Follow-up in 3 months for psoriasis, earlier for new or changing lesions.     Staff Involved: Kayla (PGY2)/Heena Garza MD MPH  PGY2 Medicine/Dermatology Resident  Pager: [phone]  I, Vita Naik MD, saw this patient with the resident and agree with the resident s findings and plan of care as documented in the resident s note.

## 2021-03-30 NOTE — LETTER
3/30/2021       RE: Annette Silverman  [address]  [place] [address]     Dear Colleague,    Thank you for referring your patient, Annette Silverman, to the [facility] [place] [facility]. Please see a copy of my visit note below.    [facility] Dermatology Note      Dermatology Problem List:  1. Psoriasis of the scalp and left ear canal  -Current tx: clobetasol 0.05% solution, fluocinolone acetonide 0.01% scalp oil  2. Inverse psoriasis of genital area  -Current tx: Starting Humira (3/30/21), hydrocortisone 2.5% ointment    Encounter Date: Mar 30, 2021    CC:  Chief Complaint   Patient presents with     Derm Problem     Psoriasis f/u      History of Present Illness:  Ms. Annette Silverman is a 26 year old female who presents for follow up of psoriasis. Using clobetasol for scalp, hydrocortisone cream for ear and genital area. Things have not changed much since her last visit and she feels very bothered by the current symptoms. The groin area is bothersome especially with urination and sexual activity. She feels like it has gotten to the point where her skin disease does interfere with her quality of life with burning and pain with the above activities.    Past Medical History:   There is no problem list on file for this patient.    Past Medical History:   Diagnosis Date     Autoimmune disease (H)     Psorasis     No past surgical history on file.    Social History:  Patient reports that she has never smoked. She has never used smokeless tobacco. She reports that she does not drink alcohol or use drugs.    Family History:  Family History   Problem Relation Age of Onset     Stomach Problem Mother      Skin Cancer No family hx of      Melanoma No family hx of      Medications:  Current Outpatient Medications   Medication Sig Dispense Refill     cetirizine (ZYRTEC ALLERGY) 10 MG tablet Take 1 tablet (10 mg) by  mouth 2 times daily 60 tablet 1     clobetasol (TEMOVATE) 0.05 % external solution Apply topically 2 times daily 120 mL 6     Fluocinolone Acetonide Scalp (DERMA-SMOOTHE/FS SCALP) 0.01 % OIL oil Use daily with shower cap 120 mL 11     hydrocortisone 2.5 % ointment Use daily to groin rash 60 g 11     Multiple Vitamin (MULTIVITAMIN ADULT PO)        norethindrone-ethinyl estradiol-iron (ESTROSTEP FE) 1-20/1-30/1-35 MG-MCG per tablet Take 1 tablet by mouth daily       azelastine-fluticasone (DYMISTA) 137-50 MCG/ACT nasal spray Spray 1 spray into both nostrils 2 times daily (Patient not taking: Reported on 12/17/2018) 1 Bottle 4     azelastine-fluticasone (DYMISTA) 137-50 MCG/ACT nasal spray Spray 1 spray into both nostrils 2 times daily (Patient not taking: Reported on 12/17/2018) 1 Bottle 1     Fluocinolone Acetonide Scalp 0.01 % OIL oil Use twice weekly until stable (Patient not taking: Reported on 10/7/2020) 120 mL 6     fluocinonide (LIDEX) 0.05 % solution Apply topically 2 times daily (Patient not taking: Reported on 10/7/2020) 60 mL 3     Allergies   Allergen Reactions     Seasonal Allergies Other (See Comments)     Review of Systems:  -Constitutional: Otherwise feeling well today, in usual state of health.  -Skin: As above in HPI. No additional skin concerns.    Physical exam:  Vitals: There were no vitals taken for this visit.  GEN: This is a well developed, well-nourished female in no acute distress, in a pleasant mood.    SKIN: Focused examination of the scalp, ears bilaterally, and genital area was performed.  -Diffuse erythematous plaques with white flakes over the parietal and occipital scalp, and the left ear canal.   -Erythematous plaques on the labia major bilaterally.   -No other lesions of concern on areas examined.     Impression/Plan:  1. Psoriasis of the scalp and L ear canal   - Continue clobetasol 0.05% solution, fluocinolone acetonide 0.01% scalp oil daily to scalp as needed  - Continue  hydrocortisone cream to L ear canal as needed    2. Inverse psoriasis of the gential area  The psoriasis has not been responsive to topical medications despite consistent use of topical medications. Given the extensive genital involvement, the patient is experiencing extreme discomfort with urination and sexual activity. Psoriasis is a major contributor to skin associated quality of life impact and studies have shown that inverse psoriasis/genital involvement can have a a very significant negative impact on quality of life. Discussed that since topical therapies have not been effective, we would recommend considering TNF alpha inhibitors like Humira given the significant quality of life impact.  - Obtained labs for Humira today (CBC, CMP, Hep B, Hep C, Quant gold, pregnancy test)  - Will also chest CXR given that Histoplasmosis is endemic to this area  - Ordered Humira, awaiting prior authorization  - Recommend continuing the topicals in the interim, while awaiting prior authorization  - Discussed that these medications are used typically to treat psoriasis and target one area of the immune system to bring it into a more normal range. Side effects include: injection site reactions, reactivation of some infections (so we check some labs yearly to make sure that this does not happen and get an xray before this start).  Advised patient to hold the injection if she experiences fever, chills, need to be on antibiotics, etc    Follow-up in 3 months for psoriasis, earlier for new or changing lesions.     Staff Involved: Kayla (PGY2)/Heena Garza MD MPH  PGY2 Medicine/Dermatology Resident  Pager: [phone]  IVita MD, saw this patient with the resident and agree with the resident s findings and plan of care as documented in the resident s note.

## 2021-03-30 NOTE — TELEPHONE ENCOUNTER
PA Initiation    Medication: Humira - Pending  Insurance Company: [facility] - Phone [phone] Fax [phone]  Pharmacy Filling the Rx:    Filling Pharmacy Phone:    Filling Pharmacy Fax:    Start Date: 3/30/2021    Saved the prior authorization in Cover My Meds. Waiting for the TB test results.

## 2021-03-31 LAB
GAMMA INTERFERON BACKGROUND BLD IA-ACNC: 0 IU/ML
M TB IFN-G CD4+ BCKGRND COR BLD-ACNC: 10 IU/ML
M TB TUBERC IFN-G BLD QL: NEGATIVE
MITOGEN IGNF BCKGRD COR BLD-ACNC: 0 IU/ML
MITOGEN IGNF BCKGRD COR BLD-ACNC: 0.02 IU/ML

## 2021-04-05 NOTE — TELEPHONE ENCOUNTER
Prior Authorization Approval    Authorization Effective Date: 3/30/2021  Authorization Expiration Date: 3/30/2022  Medication: Humira - Approved  Approved Dose/Quantity:   Reference #: [id number]   Insurance Company: [employer] - Phone [phone] Fax [phone]  Expected CoPay: 25.00     CoPay Card Available:      Foundation Assistance Needed:    Which Pharmacy is filling the prescription (Not needed for infusion/clinic administered):    Pharmacy Notified: Yes  Patient Notified: Yes    Left a message for the patient to call me.

## 2021-04-25 ENCOUNTER — HEALTH MAINTENANCE LETTER (OUTPATIENT)
Age: 26
End: 2021-04-25

## 2021-10-09 ENCOUNTER — HEALTH MAINTENANCE LETTER (OUTPATIENT)
Age: 26
End: 2021-10-09

## 2022-05-21 ENCOUNTER — HEALTH MAINTENANCE LETTER (OUTPATIENT)
Age: 27
End: 2022-05-21

## 2022-09-17 ENCOUNTER — HEALTH MAINTENANCE LETTER (OUTPATIENT)
Age: 27
End: 2022-09-17

## 2023-06-04 ENCOUNTER — HEALTH MAINTENANCE LETTER (OUTPATIENT)
Age: 28
End: 2023-06-04